# Patient Record
Sex: MALE | Race: WHITE | HISPANIC OR LATINO | Employment: OTHER | URBAN - METROPOLITAN AREA
[De-identification: names, ages, dates, MRNs, and addresses within clinical notes are randomized per-mention and may not be internally consistent; named-entity substitution may affect disease eponyms.]

---

## 2020-09-20 ENCOUNTER — HOSPITAL ENCOUNTER (EMERGENCY)
Facility: HOSPITAL | Age: 60
End: 2020-09-21
Attending: EMERGENCY MEDICINE | Admitting: EMERGENCY MEDICINE
Payer: COMMERCIAL

## 2020-09-20 DIAGNOSIS — I47.2 V-TACH (HCC): Primary | ICD-10-CM

## 2020-09-20 PROCEDURE — 80053 COMPREHEN METABOLIC PANEL: CPT | Performed by: EMERGENCY MEDICINE

## 2020-09-20 PROCEDURE — 36415 COLL VENOUS BLD VENIPUNCTURE: CPT | Performed by: EMERGENCY MEDICINE

## 2020-09-20 PROCEDURE — 99285 EMERGENCY DEPT VISIT HI MDM: CPT

## 2020-09-20 PROCEDURE — 85025 COMPLETE CBC W/AUTO DIFF WBC: CPT | Performed by: EMERGENCY MEDICINE

## 2020-09-20 PROCEDURE — 96375 TX/PRO/DX INJ NEW DRUG ADDON: CPT

## 2020-09-20 PROCEDURE — 99292 CRITICAL CARE ADDL 30 MIN: CPT | Performed by: EMERGENCY MEDICINE

## 2020-09-20 PROCEDURE — 84484 ASSAY OF TROPONIN QUANT: CPT | Performed by: EMERGENCY MEDICINE

## 2020-09-20 PROCEDURE — 85730 THROMBOPLASTIN TIME PARTIAL: CPT | Performed by: EMERGENCY MEDICINE

## 2020-09-20 PROCEDURE — 99291 CRITICAL CARE FIRST HOUR: CPT | Performed by: EMERGENCY MEDICINE

## 2020-09-20 PROCEDURE — 93005 ELECTROCARDIOGRAM TRACING: CPT

## 2020-09-20 PROCEDURE — 83735 ASSAY OF MAGNESIUM: CPT | Performed by: EMERGENCY MEDICINE

## 2020-09-20 PROCEDURE — 84100 ASSAY OF PHOSPHORUS: CPT | Performed by: EMERGENCY MEDICINE

## 2020-09-20 PROCEDURE — 85610 PROTHROMBIN TIME: CPT | Performed by: EMERGENCY MEDICINE

## 2020-09-20 PROCEDURE — 82948 REAGENT STRIP/BLOOD GLUCOSE: CPT

## 2020-09-20 RX ORDER — MAGNESIUM SULFATE 1 G/100ML
1 INJECTION INTRAVENOUS ONCE
Status: COMPLETED | OUTPATIENT
Start: 2020-09-21 | End: 2020-09-21

## 2020-09-20 RX ORDER — FENTANYL CITRATE 50 UG/ML
25 INJECTION, SOLUTION INTRAMUSCULAR; INTRAVENOUS ONCE
Status: COMPLETED | OUTPATIENT
Start: 2020-09-21 | End: 2020-09-20

## 2020-09-20 RX ADMIN — FENTANYL CITRATE 25 MCG: 50 INJECTION INTRAMUSCULAR; INTRAVENOUS at 23:58

## 2020-09-20 RX ADMIN — MAGNESIUM SULFATE HEPTAHYDRATE 1 G: 1 INJECTION, SOLUTION INTRAVENOUS at 23:59

## 2020-09-21 ENCOUNTER — HOSPITAL ENCOUNTER (INPATIENT)
Facility: HOSPITAL | Age: 60
LOS: 5 days | Discharge: HOME | DRG: 309 | End: 2020-09-26
Attending: ANESTHESIOLOGY | Admitting: ANESTHESIOLOGY
Payer: COMMERCIAL

## 2020-09-21 ENCOUNTER — APPOINTMENT (EMERGENCY)
Dept: RADIOLOGY | Facility: HOSPITAL | Age: 60
End: 2020-09-21
Payer: COMMERCIAL

## 2020-09-21 VITALS
TEMPERATURE: 98.4 F | RESPIRATION RATE: 18 BRPM | BODY MASS INDEX: 45.7 KG/M2 | DIASTOLIC BLOOD PRESSURE: 53 MMHG | OXYGEN SATURATION: 93 % | SYSTOLIC BLOOD PRESSURE: 103 MMHG | HEART RATE: 82 BPM | HEIGHT: 72 IN

## 2020-09-21 DIAGNOSIS — E11.65 TYPE 2 DIABETES MELLITUS WITH HYPERGLYCEMIA, WITH LONG-TERM CURRENT USE OF INSULIN (HCC): ICD-10-CM

## 2020-09-21 DIAGNOSIS — E87.6 HYPOKALEMIA: ICD-10-CM

## 2020-09-21 DIAGNOSIS — I25.10 CAD (CORONARY ARTERY DISEASE): ICD-10-CM

## 2020-09-21 DIAGNOSIS — Z79.4 TYPE 2 DIABETES MELLITUS WITH HYPERGLYCEMIA, WITH LONG-TERM CURRENT USE OF INSULIN (HCC): ICD-10-CM

## 2020-09-21 DIAGNOSIS — I47.2 VENTRICULAR TACHYCARDIA (HCC): Primary | ICD-10-CM

## 2020-09-21 DIAGNOSIS — I48.0 PAROXYSMAL A-FIB (HCC): ICD-10-CM

## 2020-09-21 DIAGNOSIS — I50.9 CHF (CONGESTIVE HEART FAILURE) (HCC): ICD-10-CM

## 2020-09-21 DIAGNOSIS — R52 PAIN: ICD-10-CM

## 2020-09-21 LAB
ALBUMIN SERPL BCP-MCNC: 2.7 G/DL (ref 3.5–5)
ALBUMIN SERPL BCP-MCNC: 3 G/DL (ref 3.5–5)
ALBUMIN SERPL BCP-MCNC: 3.3 G/DL (ref 3.5–5)
ALP SERPL-CCNC: 135 U/L (ref 46–116)
ALP SERPL-CCNC: 142 U/L (ref 46–116)
ALP SERPL-CCNC: 148 U/L (ref 46–116)
ALT SERPL W P-5'-P-CCNC: 26 U/L (ref 12–78)
ALT SERPL W P-5'-P-CCNC: 28 U/L (ref 12–78)
ALT SERPL W P-5'-P-CCNC: 29 U/L (ref 12–78)
ANION GAP SERPL CALCULATED.3IONS-SCNC: 15 MMOL/L (ref 4–13)
ANION GAP SERPL CALCULATED.3IONS-SCNC: 6 MMOL/L (ref 4–13)
ANION GAP SERPL CALCULATED.3IONS-SCNC: 7 MMOL/L (ref 4–13)
ANION GAP SERPL CALCULATED.3IONS-SCNC: 7 MMOL/L (ref 4–13)
APTT PPP: 34 SECONDS (ref 23–37)
AST SERPL W P-5'-P-CCNC: 23 U/L (ref 5–45)
AST SERPL W P-5'-P-CCNC: 28 U/L (ref 5–45)
AST SERPL W P-5'-P-CCNC: 30 U/L (ref 5–45)
ATRIAL RATE: 0 BPM
ATRIAL RATE: 277 BPM
ATRIAL RATE: 68 BPM
ATRIAL RATE: 94 BPM
ATRIAL RATE: 98 BPM
BASE EXCESS BLDA CALC-SCNC: 8 MMOL/L (ref -2–3)
BASOPHILS # BLD AUTO: 0.03 THOUSANDS/ΜL (ref 0–0.1)
BASOPHILS # BLD AUTO: 0.09 THOUSANDS/ΜL (ref 0–0.1)
BASOPHILS NFR BLD AUTO: 0 % (ref 0–1)
BASOPHILS NFR BLD AUTO: 1 % (ref 0–1)
BILIRUB SERPL-MCNC: 0.33 MG/DL (ref 0.2–1)
BILIRUB SERPL-MCNC: 0.35 MG/DL (ref 0.2–1)
BILIRUB SERPL-MCNC: 0.4 MG/DL (ref 0.2–1)
BILIRUB UR QL STRIP: NEGATIVE
BUN SERPL-MCNC: 20 MG/DL (ref 5–25)
BUN SERPL-MCNC: 20 MG/DL (ref 5–25)
BUN SERPL-MCNC: 23 MG/DL (ref 5–25)
BUN SERPL-MCNC: 28 MG/DL (ref 5–25)
CA-I BLD-SCNC: 1.05 MMOL/L (ref 1.12–1.32)
CALCIUM ALBUM COR SERPL-MCNC: 9 MG/DL (ref 8.3–10.1)
CALCIUM ALBUM COR SERPL-MCNC: 9.2 MG/DL (ref 8.3–10.1)
CALCIUM ALBUM COR SERPL-MCNC: 9.3 MG/DL (ref 8.3–10.1)
CALCIUM SERPL-MCNC: 8.2 MG/DL (ref 8.3–10.1)
CALCIUM SERPL-MCNC: 8.3 MG/DL (ref 8.3–10.1)
CALCIUM SERPL-MCNC: 8.4 MG/DL (ref 8.3–10.1)
CALCIUM SERPL-MCNC: 8.6 MG/DL (ref 8.3–10.1)
CHLORIDE SERPL-SCNC: 100 MMOL/L (ref 100–108)
CHLORIDE SERPL-SCNC: 103 MMOL/L (ref 100–108)
CHLORIDE SERPL-SCNC: 94 MMOL/L (ref 100–108)
CHLORIDE SERPL-SCNC: 96 MMOL/L (ref 100–108)
CLARITY UR: CLEAR
CO2 SERPL-SCNC: 28 MMOL/L (ref 21–32)
CO2 SERPL-SCNC: 29 MMOL/L (ref 21–32)
CO2 SERPL-SCNC: 29 MMOL/L (ref 21–32)
CO2 SERPL-SCNC: 32 MMOL/L (ref 21–32)
COLOR UR: YELLOW
CREAT SERPL-MCNC: 1.54 MG/DL (ref 0.6–1.3)
CREAT SERPL-MCNC: 1.75 MG/DL (ref 0.6–1.3)
CREAT SERPL-MCNC: 1.76 MG/DL (ref 0.6–1.3)
CREAT SERPL-MCNC: 2.15 MG/DL (ref 0.6–1.3)
EOSINOPHIL # BLD AUTO: 0.02 THOUSAND/ΜL (ref 0–0.61)
EOSINOPHIL # BLD AUTO: 0.44 THOUSAND/ΜL (ref 0–0.61)
EOSINOPHIL NFR BLD AUTO: 0 % (ref 0–6)
EOSINOPHIL NFR BLD AUTO: 3 % (ref 0–6)
ERYTHROCYTE [DISTWIDTH] IN BLOOD BY AUTOMATED COUNT: 16.3 % (ref 11.6–15.1)
ERYTHROCYTE [DISTWIDTH] IN BLOOD BY AUTOMATED COUNT: 16.4 % (ref 11.6–15.1)
GFR SERPL CREATININE-BSD FRML MDRD: 33 ML/MIN/1.73SQ M
GFR SERPL CREATININE-BSD FRML MDRD: 41 ML/MIN/1.73SQ M
GFR SERPL CREATININE-BSD FRML MDRD: 42 ML/MIN/1.73SQ M
GFR SERPL CREATININE-BSD FRML MDRD: 49 ML/MIN/1.73SQ M
GLUCOSE SERPL-MCNC: 127 MG/DL (ref 65–140)
GLUCOSE SERPL-MCNC: 135 MG/DL (ref 65–140)
GLUCOSE SERPL-MCNC: 162 MG/DL (ref 65–140)
GLUCOSE SERPL-MCNC: 167 MG/DL (ref 65–140)
GLUCOSE SERPL-MCNC: 173 MG/DL (ref 65–140)
GLUCOSE SERPL-MCNC: 194 MG/DL (ref 65–140)
GLUCOSE SERPL-MCNC: 198 MG/DL (ref 65–140)
GLUCOSE SERPL-MCNC: 203 MG/DL (ref 65–140)
GLUCOSE SERPL-MCNC: 217 MG/DL (ref 65–140)
GLUCOSE SERPL-MCNC: 228 MG/DL (ref 65–140)
GLUCOSE SERPL-MCNC: 272 MG/DL (ref 65–140)
GLUCOSE UR STRIP-MCNC: NEGATIVE MG/DL
HCO3 BLDA-SCNC: 30.5 MMOL/L (ref 24–30)
HCT VFR BLD AUTO: 38.6 % (ref 36.5–49.3)
HCT VFR BLD AUTO: 40.9 % (ref 36.5–49.3)
HCT VFR BLD CALC: 41 % (ref 36.5–49.3)
HGB BLD-MCNC: 11.8 G/DL (ref 12–17)
HGB BLD-MCNC: 12.8 G/DL (ref 12–17)
HGB BLDA-MCNC: 13.9 G/DL (ref 12–17)
HGB UR QL STRIP.AUTO: NEGATIVE
IMM GRANULOCYTES # BLD AUTO: 0.05 THOUSAND/UL (ref 0–0.2)
IMM GRANULOCYTES # BLD AUTO: 0.08 THOUSAND/UL (ref 0–0.2)
IMM GRANULOCYTES NFR BLD AUTO: 0 % (ref 0–2)
IMM GRANULOCYTES NFR BLD AUTO: 1 % (ref 0–2)
INR PPP: 1.97 (ref 0.84–1.19)
KETONES UR STRIP-MCNC: NEGATIVE MG/DL
LEUKOCYTE ESTERASE UR QL STRIP: NEGATIVE
LYMPHOCYTES # BLD AUTO: 0.36 THOUSANDS/ΜL (ref 0.6–4.47)
LYMPHOCYTES # BLD AUTO: 2.6 THOUSANDS/ΜL (ref 0.6–4.47)
LYMPHOCYTES NFR BLD AUTO: 17 % (ref 14–44)
LYMPHOCYTES NFR BLD AUTO: 3 % (ref 14–44)
MAGNESIUM SERPL-MCNC: 1.8 MG/DL (ref 1.6–2.6)
MAGNESIUM SERPL-MCNC: 2.6 MG/DL (ref 1.6–2.6)
MCH RBC QN AUTO: 25.1 PG (ref 26.8–34.3)
MCH RBC QN AUTO: 25.3 PG (ref 26.8–34.3)
MCHC RBC AUTO-ENTMCNC: 30.6 G/DL (ref 31.4–37.4)
MCHC RBC AUTO-ENTMCNC: 31.3 G/DL (ref 31.4–37.4)
MCV RBC AUTO: 81 FL (ref 82–98)
MCV RBC AUTO: 82 FL (ref 82–98)
MONOCYTES # BLD AUTO: 0.56 THOUSAND/ΜL (ref 0.17–1.22)
MONOCYTES # BLD AUTO: 1.43 THOUSAND/ΜL (ref 0.17–1.22)
MONOCYTES NFR BLD AUTO: 5 % (ref 4–12)
MONOCYTES NFR BLD AUTO: 9 % (ref 4–12)
NEUTROPHILS # BLD AUTO: 10.7 THOUSANDS/ΜL (ref 1.85–7.62)
NEUTROPHILS # BLD AUTO: 11.37 THOUSANDS/ΜL (ref 1.85–7.62)
NEUTS SEG NFR BLD AUTO: 69 % (ref 43–75)
NEUTS SEG NFR BLD AUTO: 92 % (ref 43–75)
NITRITE UR QL STRIP: NEGATIVE
NRBC BLD AUTO-RTO: 0 /100 WBCS
NRBC BLD AUTO-RTO: 0 /100 WBCS
NT-PROBNP SERPL-MCNC: 614 PG/ML
P AXIS: 236 DEGREES
P AXIS: 58 DEGREES
PCO2 BLD: 32 MMOL/L (ref 21–32)
PCO2 BLD: 34.1 MM HG (ref 42–50)
PH BLD: 7.56 [PH] (ref 7.3–7.4)
PH UR STRIP.AUTO: 7 [PH]
PHOSPHATE SERPL-MCNC: 1.7 MG/DL (ref 2.7–4.5)
PHOSPHATE SERPL-MCNC: 3.4 MG/DL (ref 2.7–4.5)
PLATELET # BLD AUTO: 259 THOUSANDS/UL (ref 149–390)
PLATELET # BLD AUTO: 343 THOUSANDS/UL (ref 149–390)
PMV BLD AUTO: 9.6 FL (ref 8.9–12.7)
PMV BLD AUTO: 9.6 FL (ref 8.9–12.7)
PO2 BLD: 36 MM HG (ref 35–45)
POTASSIUM BLD-SCNC: <2 MMOL/L (ref 3.5–5.3)
POTASSIUM SERPL-SCNC: 1.8 MMOL/L (ref 3.5–5.3)
POTASSIUM SERPL-SCNC: 2.7 MMOL/L (ref 3.5–5.3)
POTASSIUM SERPL-SCNC: 3 MMOL/L (ref 3.5–5.3)
POTASSIUM SERPL-SCNC: 3.5 MMOL/L (ref 3.5–5.3)
PR INTERVAL: 178 MS
PR INTERVAL: 192 MS
PROT SERPL-MCNC: 6.8 G/DL (ref 6.4–8.2)
PROT SERPL-MCNC: 7.2 G/DL (ref 6.4–8.2)
PROT SERPL-MCNC: 7.9 G/DL (ref 6.4–8.2)
PROT UR STRIP-MCNC: NEGATIVE MG/DL
PROTHROMBIN TIME: 22.5 SECONDS (ref 11.6–14.5)
QRS AXIS: 33 DEGREES
QRS AXIS: 35 DEGREES
QRS AXIS: 40 DEGREES
QRS AXIS: 46 DEGREES
QRS AXIS: 55 DEGREES
QRSD INTERVAL: 102 MS
QRSD INTERVAL: 125 MS
QRSD INTERVAL: 134 MS
QRSD INTERVAL: 84 MS
QRSD INTERVAL: 86 MS
QT INTERVAL: 320 MS
QT INTERVAL: 322 MS
QT INTERVAL: 344 MS
QT INTERVAL: 372 MS
QT INTERVAL: 383 MS
QTC INTERVAL: 408 MS
QTC INTERVAL: 408 MS
QTC INTERVAL: 417 MS
QTC INTERVAL: 436 MS
QTC INTERVAL: 450 MS
RBC # BLD AUTO: 4.71 MILLION/UL (ref 3.88–5.62)
RBC # BLD AUTO: 5.06 MILLION/UL (ref 3.88–5.62)
SAO2 % BLD FROM PO2: 77 % (ref 60–85)
SARS-COV-2 RNA RESP QL NAA+PROBE: NEGATIVE
SODIUM BLD-SCNC: 139 MMOL/L (ref 136–145)
SODIUM SERPL-SCNC: 135 MMOL/L (ref 136–145)
SODIUM SERPL-SCNC: 136 MMOL/L (ref 136–145)
SODIUM SERPL-SCNC: 137 MMOL/L (ref 136–145)
SODIUM SERPL-SCNC: 138 MMOL/L (ref 136–145)
SP GR UR STRIP.AUTO: 1.01 (ref 1–1.03)
SPECIMEN SOURCE: ABNORMAL
T WAVE AXIS: 210 DEGREES
T WAVE AXIS: 224 DEGREES
T WAVE AXIS: 232 DEGREES
T WAVE AXIS: 240 DEGREES
T WAVE AXIS: 260 DEGREES
T4 FREE SERPL-MCNC: 1.22 NG/DL (ref 0.76–1.46)
TROPONIN I SERPL-MCNC: 0.08 NG/ML
TROPONIN I SERPL-MCNC: 2.05 NG/ML
TROPONIN I SERPL-MCNC: 2.07 NG/ML
TROPONIN I SERPL-MCNC: 2.38 NG/ML
TROPONIN I SERPL-MCNC: 2.49 NG/ML
TSH SERPL DL<=0.05 MIU/L-ACNC: 5.08 UIU/ML (ref 0.36–3.74)
UROBILINOGEN UR QL STRIP.AUTO: 0.2 E.U./DL
VENTRICULAR RATE: 101 BPM
VENTRICULAR RATE: 68 BPM
VENTRICULAR RATE: 88 BPM
VENTRICULAR RATE: 97 BPM
VENTRICULAR RATE: 98 BPM
WBC # BLD AUTO: 12.39 THOUSAND/UL (ref 4.31–10.16)
WBC # BLD AUTO: 15.34 THOUSAND/UL (ref 4.31–10.16)

## 2020-09-21 PROCEDURE — 85014 HEMATOCRIT: CPT

## 2020-09-21 PROCEDURE — 80048 BASIC METABOLIC PNL TOTAL CA: CPT | Performed by: INTERNAL MEDICINE

## 2020-09-21 PROCEDURE — 84132 ASSAY OF SERUM POTASSIUM: CPT

## 2020-09-21 PROCEDURE — 96366 THER/PROPH/DIAG IV INF ADDON: CPT

## 2020-09-21 PROCEDURE — 82948 REAGENT STRIP/BLOOD GLUCOSE: CPT

## 2020-09-21 PROCEDURE — 99221 1ST HOSP IP/OBS SF/LOW 40: CPT | Performed by: INTERNAL MEDICINE

## 2020-09-21 PROCEDURE — 96375 TX/PRO/DX INJ NEW DRUG ADDON: CPT

## 2020-09-21 PROCEDURE — 94760 N-INVAS EAR/PLS OXIMETRY 1: CPT

## 2020-09-21 PROCEDURE — 99223 1ST HOSP IP/OBS HIGH 75: CPT | Performed by: INTERNAL MEDICINE

## 2020-09-21 PROCEDURE — U0003 INFECTIOUS AGENT DETECTION BY NUCLEIC ACID (DNA OR RNA); SEVERE ACUTE RESPIRATORY SYNDROME CORONAVIRUS 2 (SARS-COV-2) (CORONAVIRUS DISEASE [COVID-19]), AMPLIFIED PROBE TECHNIQUE, MAKING USE OF HIGH THROUGHPUT TECHNOLOGIES AS DESCRIBED BY CMS-2020-01-R: HCPCS | Performed by: PHYSICIAN ASSISTANT

## 2020-09-21 PROCEDURE — 83880 ASSAY OF NATRIURETIC PEPTIDE: CPT | Performed by: ANESTHESIOLOGY

## 2020-09-21 PROCEDURE — 84484 ASSAY OF TROPONIN QUANT: CPT | Performed by: INTERNAL MEDICINE

## 2020-09-21 PROCEDURE — 83735 ASSAY OF MAGNESIUM: CPT | Performed by: EMERGENCY MEDICINE

## 2020-09-21 PROCEDURE — 80053 COMPREHEN METABOLIC PANEL: CPT | Performed by: EMERGENCY MEDICINE

## 2020-09-21 PROCEDURE — 84443 ASSAY THYROID STIM HORMONE: CPT | Performed by: EMERGENCY MEDICINE

## 2020-09-21 PROCEDURE — 84484 ASSAY OF TROPONIN QUANT: CPT | Performed by: EMERGENCY MEDICINE

## 2020-09-21 PROCEDURE — 85025 COMPLETE CBC W/AUTO DIFF WBC: CPT | Performed by: EMERGENCY MEDICINE

## 2020-09-21 PROCEDURE — 84439 ASSAY OF FREE THYROXINE: CPT | Performed by: EMERGENCY MEDICINE

## 2020-09-21 PROCEDURE — 82947 ASSAY GLUCOSE BLOOD QUANT: CPT

## 2020-09-21 PROCEDURE — 93005 ELECTROCARDIOGRAM TRACING: CPT

## 2020-09-21 PROCEDURE — 84100 ASSAY OF PHOSPHORUS: CPT | Performed by: EMERGENCY MEDICINE

## 2020-09-21 PROCEDURE — 82803 BLOOD GASES ANY COMBINATION: CPT

## 2020-09-21 PROCEDURE — 81003 URINALYSIS AUTO W/O SCOPE: CPT | Performed by: EMERGENCY MEDICINE

## 2020-09-21 PROCEDURE — 94660 CPAP INITIATION&MGMT: CPT

## 2020-09-21 PROCEDURE — 71045 X-RAY EXAM CHEST 1 VIEW: CPT

## 2020-09-21 PROCEDURE — 93010 ELECTROCARDIOGRAM REPORT: CPT | Performed by: INTERNAL MEDICINE

## 2020-09-21 PROCEDURE — NC001 PR NO CHARGE: Performed by: INTERNAL MEDICINE

## 2020-09-21 PROCEDURE — 82330 ASSAY OF CALCIUM: CPT

## 2020-09-21 PROCEDURE — 96368 THER/DIAG CONCURRENT INF: CPT

## 2020-09-21 PROCEDURE — 80053 COMPREHEN METABOLIC PANEL: CPT | Performed by: INTERNAL MEDICINE

## 2020-09-21 PROCEDURE — 84295 ASSAY OF SERUM SODIUM: CPT

## 2020-09-21 PROCEDURE — 99223 1ST HOSP IP/OBS HIGH 75: CPT | Performed by: ANESTHESIOLOGY

## 2020-09-21 PROCEDURE — 96365 THER/PROPH/DIAG IV INF INIT: CPT

## 2020-09-21 RX ORDER — CHLORAL HYDRATE 500 MG
1000 CAPSULE ORAL DAILY
Status: DISCONTINUED | OUTPATIENT
Start: 2020-09-21 | End: 2020-09-26 | Stop reason: HOSPADM

## 2020-09-21 RX ORDER — FENTANYL CITRATE 50 UG/ML
1 INJECTION, SOLUTION INTRAMUSCULAR; INTRAVENOUS ONCE
Status: COMPLETED | OUTPATIENT
Start: 2020-09-21 | End: 2020-09-21

## 2020-09-21 RX ORDER — POTASSIUM CHLORIDE 29.8 MG/ML
40 INJECTION INTRAVENOUS ONCE
Status: COMPLETED | OUTPATIENT
Start: 2020-09-21 | End: 2020-09-21

## 2020-09-21 RX ORDER — LANOLIN ALCOHOL/MO/W.PET/CERES
6 CREAM (GRAM) TOPICAL
Status: DISCONTINUED | OUTPATIENT
Start: 2020-09-21 | End: 2020-09-26 | Stop reason: HOSPADM

## 2020-09-21 RX ORDER — POTASSIUM CHLORIDE 20 MEQ/1
40 TABLET, EXTENDED RELEASE ORAL ONCE
Status: COMPLETED | OUTPATIENT
Start: 2020-09-21 | End: 2020-09-21

## 2020-09-21 RX ORDER — MAGNESIUM SULFATE 1 G/100ML
1 INJECTION INTRAVENOUS ONCE
Status: COMPLETED | OUTPATIENT
Start: 2020-09-21 | End: 2020-09-21

## 2020-09-21 RX ORDER — POTASSIUM CHLORIDE 14.9 MG/ML
20 INJECTION INTRAVENOUS ONCE
Status: COMPLETED | OUTPATIENT
Start: 2020-09-21 | End: 2020-09-22

## 2020-09-21 RX ORDER — POTASSIUM CHLORIDE 29.8 MG/ML
40 INJECTION INTRAVENOUS ONCE
Status: DISCONTINUED | OUTPATIENT
Start: 2020-09-21 | End: 2020-09-21 | Stop reason: HOSPADM

## 2020-09-21 RX ORDER — OXYCODONE HYDROCHLORIDE 5 MG/1
5 TABLET ORAL EVERY 6 HOURS PRN
Status: DISCONTINUED | OUTPATIENT
Start: 2020-09-21 | End: 2020-09-22

## 2020-09-21 RX ORDER — GABAPENTIN 300 MG/1
600 CAPSULE ORAL 3 TIMES DAILY
Status: DISCONTINUED | OUTPATIENT
Start: 2020-09-21 | End: 2020-09-26 | Stop reason: HOSPADM

## 2020-09-21 RX ORDER — OXYCODONE HYDROCHLORIDE 5 MG/1
5 CAPSULE ORAL EVERY 6 HOURS PRN
COMMUNITY

## 2020-09-21 RX ORDER — ASPIRIN 81 MG/1
81 TABLET, CHEWABLE ORAL DAILY
Status: DISCONTINUED | OUTPATIENT
Start: 2020-09-21 | End: 2020-09-26 | Stop reason: HOSPADM

## 2020-09-21 RX ORDER — LIDOCAINE HYDROCHLORIDE ANHYDROUS AND DEXTROSE MONOHYDRATE .8; 5 G/100ML; G/100ML
1 INJECTION, SOLUTION INTRAVENOUS CONTINUOUS
Status: DISCONTINUED | OUTPATIENT
Start: 2020-09-21 | End: 2020-09-21 | Stop reason: HOSPADM

## 2020-09-21 RX ORDER — HEPARIN SODIUM 5000 [USP'U]/ML
7500 INJECTION, SOLUTION INTRAVENOUS; SUBCUTANEOUS EVERY 8 HOURS SCHEDULED
Status: DISCONTINUED | OUTPATIENT
Start: 2020-09-21 | End: 2020-09-21

## 2020-09-21 RX ORDER — FENTANYL CITRATE 50 UG/ML
50 INJECTION, SOLUTION INTRAMUSCULAR; INTRAVENOUS ONCE
Status: COMPLETED | OUTPATIENT
Start: 2020-09-21 | End: 2020-09-21

## 2020-09-21 RX ORDER — FLUTICASONE PROPIONATE 50 MCG
2 SPRAY, SUSPENSION (ML) NASAL DAILY
Status: DISCONTINUED | OUTPATIENT
Start: 2020-09-21 | End: 2020-09-26 | Stop reason: HOSPADM

## 2020-09-21 RX ORDER — ACETAMINOPHEN 325 MG/1
650 TABLET ORAL EVERY 4 HOURS PRN
Status: DISCONTINUED | OUTPATIENT
Start: 2020-09-21 | End: 2020-09-26 | Stop reason: HOSPADM

## 2020-09-21 RX ORDER — AMIODARONE HYDROCHLORIDE 50 MG/ML
INJECTION, SOLUTION INTRAVENOUS
Status: COMPLETED
Start: 2020-09-21 | End: 2020-09-21

## 2020-09-21 RX ORDER — POTASSIUM CHLORIDE 20 MEQ/1
20 TABLET, EXTENDED RELEASE ORAL ONCE
Status: COMPLETED | OUTPATIENT
Start: 2020-09-21 | End: 2020-09-21

## 2020-09-21 RX ORDER — FENTANYL CITRATE 50 UG/ML
INJECTION, SOLUTION INTRAMUSCULAR; INTRAVENOUS
Status: COMPLETED
Start: 2020-09-21 | End: 2020-09-21

## 2020-09-21 RX ORDER — FENTANYL CITRATE 50 UG/ML
25 INJECTION, SOLUTION INTRAMUSCULAR; INTRAVENOUS
Status: DISCONTINUED | OUTPATIENT
Start: 2020-09-21 | End: 2020-09-22

## 2020-09-21 RX ORDER — PANTOPRAZOLE SODIUM 40 MG/1
40 TABLET, DELAYED RELEASE ORAL
Status: DISCONTINUED | OUTPATIENT
Start: 2020-09-21 | End: 2020-09-26 | Stop reason: HOSPADM

## 2020-09-21 RX ORDER — FENTANYL CITRATE 50 UG/ML
25 INJECTION, SOLUTION INTRAMUSCULAR; INTRAVENOUS ONCE
Status: COMPLETED | OUTPATIENT
Start: 2020-09-21 | End: 2020-09-21

## 2020-09-21 RX ORDER — POTASSIUM CHLORIDE 20 MEQ/1
20 TABLET, EXTENDED RELEASE ORAL ONCE
Status: DISCONTINUED | OUTPATIENT
Start: 2020-09-21 | End: 2020-09-21

## 2020-09-21 RX ORDER — AMILORIDE HYDROCHLORIDE 5 MG/1
5 TABLET ORAL 2 TIMES DAILY
Status: DISCONTINUED | OUTPATIENT
Start: 2020-09-21 | End: 2020-09-26 | Stop reason: HOSPADM

## 2020-09-21 RX ORDER — POLYETHYLENE GLYCOL 3350 17 G/17G
17 POWDER, FOR SOLUTION ORAL DAILY
Status: DISCONTINUED | OUTPATIENT
Start: 2020-09-21 | End: 2020-09-26 | Stop reason: HOSPADM

## 2020-09-21 RX ORDER — POTASSIUM CHLORIDE 14.9 MG/ML
20 INJECTION INTRAVENOUS
Status: COMPLETED | OUTPATIENT
Start: 2020-09-21 | End: 2020-09-21

## 2020-09-21 RX ORDER — ISOSORBIDE MONONITRATE 60 MG/1
60 TABLET, EXTENDED RELEASE ORAL DAILY
Status: DISCONTINUED | OUTPATIENT
Start: 2020-09-21 | End: 2020-09-22

## 2020-09-21 RX ORDER — ATORVASTATIN CALCIUM 80 MG/1
80 TABLET, FILM COATED ORAL
Status: DISCONTINUED | OUTPATIENT
Start: 2020-09-21 | End: 2020-09-26 | Stop reason: HOSPADM

## 2020-09-21 RX ORDER — SENNOSIDES 8.6 MG
1 TABLET ORAL
Status: DISCONTINUED | OUTPATIENT
Start: 2020-09-21 | End: 2020-09-26 | Stop reason: HOSPADM

## 2020-09-21 RX ORDER — LIDOCAINE HYDROCHLORIDE ANHYDROUS AND DEXTROSE MONOHYDRATE .8; 5 G/100ML; G/100ML
1 INJECTION, SOLUTION INTRAVENOUS CONTINUOUS
Status: DISCONTINUED | OUTPATIENT
Start: 2020-09-21 | End: 2020-09-21

## 2020-09-21 RX ORDER — FERROUS SULFATE 325(65) MG
325 TABLET ORAL EVERY OTHER DAY
Status: DISCONTINUED | OUTPATIENT
Start: 2020-09-21 | End: 2020-09-26 | Stop reason: HOSPADM

## 2020-09-21 RX ORDER — INSULIN GLARGINE 100 [IU]/ML
30 INJECTION, SOLUTION SUBCUTANEOUS EVERY 12 HOURS SCHEDULED
Status: DISCONTINUED | OUTPATIENT
Start: 2020-09-21 | End: 2020-09-26 | Stop reason: HOSPADM

## 2020-09-21 RX ADMIN — LIDOCAINE HYDROCHLORIDE 100 MG: 20 INJECTION, SOLUTION INTRAVENOUS at 01:29

## 2020-09-21 RX ADMIN — INSULIN LISPRO 4 UNITS: 100 INJECTION, SOLUTION INTRAVENOUS; SUBCUTANEOUS at 11:08

## 2020-09-21 RX ADMIN — POTASSIUM CHLORIDE 40 MEQ: 29.8 INJECTION, SOLUTION INTRAVENOUS at 05:57

## 2020-09-21 RX ADMIN — AMIODARONE HYDROCHLORIDE 1 MG/MIN: 50 INJECTION, SOLUTION INTRAVENOUS at 16:54

## 2020-09-21 RX ADMIN — FENTANYL CITRATE 25 MCG: 50 INJECTION INTRAMUSCULAR; INTRAVENOUS at 19:50

## 2020-09-21 RX ADMIN — PANTOPRAZOLE SODIUM 40 MG: 40 TABLET, DELAYED RELEASE ORAL at 05:57

## 2020-09-21 RX ADMIN — GABAPENTIN 600 MG: 300 CAPSULE ORAL at 16:14

## 2020-09-21 RX ADMIN — OMEGA-3 FATTY ACIDS CAP 1000 MG 1000 MG: 1000 CAP at 08:21

## 2020-09-21 RX ADMIN — AMPICILLIN SODIUM 2000 MG: 2 INJECTION, POWDER, FOR SOLUTION INTRAMUSCULAR; INTRAVENOUS at 17:28

## 2020-09-21 RX ADMIN — FLUTICASONE PROPIONATE 2 SPRAY: 50 SPRAY, METERED NASAL at 08:21

## 2020-09-21 RX ADMIN — ATORVASTATIN CALCIUM 80 MG: 80 TABLET, FILM COATED ORAL at 16:14

## 2020-09-21 RX ADMIN — LIDOCAINE HYDROCHLORIDE 10 ML: 20 SOLUTION ORAL; TOPICAL at 20:13

## 2020-09-21 RX ADMIN — OXYCODONE HYDROCHLORIDE 5 MG: 5 TABLET ORAL at 15:03

## 2020-09-21 RX ADMIN — SODIUM PHOSPHATE, MONOBASIC, MONOHYDRATE 21 MMOL: 276; 142 INJECTION, SOLUTION INTRAVENOUS at 00:58

## 2020-09-21 RX ADMIN — AMILORIDE HYDROCLORIDE 5 MG: 5 TABLET ORAL at 22:01

## 2020-09-21 RX ADMIN — POTASSIUM CHLORIDE 40 MEQ: 1500 TABLET, EXTENDED RELEASE ORAL at 06:34

## 2020-09-21 RX ADMIN — GABAPENTIN 600 MG: 300 CAPSULE ORAL at 08:20

## 2020-09-21 RX ADMIN — POTASSIUM CHLORIDE 40 MEQ: 1500 TABLET, EXTENDED RELEASE ORAL at 15:03

## 2020-09-21 RX ADMIN — FENTANYL CITRATE 25 MCG: 50 INJECTION INTRAMUSCULAR; INTRAVENOUS at 13:43

## 2020-09-21 RX ADMIN — GABAPENTIN 600 MG: 300 CAPSULE ORAL at 20:04

## 2020-09-21 RX ADMIN — AMIODARONE HYDROCHLORIDE 1 MG/MIN: 50 INJECTION, SOLUTION INTRAVENOUS at 00:37

## 2020-09-21 RX ADMIN — FENTANYL CITRATE 25 MCG: 50 INJECTION INTRAMUSCULAR; INTRAVENOUS at 10:39

## 2020-09-21 RX ADMIN — INSULIN LISPRO 2 UNITS: 100 INJECTION, SOLUTION INTRAVENOUS; SUBCUTANEOUS at 16:18

## 2020-09-21 RX ADMIN — POTASSIUM CHLORIDE 40 MEQ: 1500 TABLET, EXTENDED RELEASE ORAL at 23:32

## 2020-09-21 RX ADMIN — AMIODARONE HYDROCHLORIDE 1 MG/MIN: 50 INJECTION, SOLUTION INTRAVENOUS at 06:25

## 2020-09-21 RX ADMIN — ASPIRIN 81 MG CHEWABLE TABLET 81 MG: 81 TABLET CHEWABLE at 08:20

## 2020-09-21 RX ADMIN — INSULIN GLARGINE 30 UNITS: 100 INJECTION, SOLUTION SUBCUTANEOUS at 13:18

## 2020-09-21 RX ADMIN — LIDOCAINE HYDROCHLORIDE ANHYDROUS AND DEXTROSE MONOHYDRATE 1 MG/MIN: .8; 5 INJECTION, SOLUTION INTRAVENOUS at 01:46

## 2020-09-21 RX ADMIN — OXYCODONE HYDROCHLORIDE 5 MG: 5 TABLET ORAL at 22:03

## 2020-09-21 RX ADMIN — FENTANYL CITRATE 25 MCG: 50 INJECTION INTRAMUSCULAR; INTRAVENOUS at 23:30

## 2020-09-21 RX ADMIN — AMPICILLIN SODIUM 2000 MG: 2 INJECTION, POWDER, FOR SOLUTION INTRAMUSCULAR; INTRAVENOUS at 11:08

## 2020-09-21 RX ADMIN — ISOSORBIDE MONONITRATE 60 MG: 60 TABLET, EXTENDED RELEASE ORAL at 08:21

## 2020-09-21 RX ADMIN — FENTANYL CITRATE 25 MCG: 50 INJECTION INTRAMUSCULAR; INTRAVENOUS at 05:14

## 2020-09-21 RX ADMIN — POTASSIUM CHLORIDE 40 MEQ: 1500 TABLET, EXTENDED RELEASE ORAL at 05:57

## 2020-09-21 RX ADMIN — RIVAROXABAN 20 MG: 20 TABLET, FILM COATED ORAL at 16:30

## 2020-09-21 RX ADMIN — POTASSIUM CHLORIDE 20 MEQ: 1500 TABLET, EXTENDED RELEASE ORAL at 02:17

## 2020-09-21 RX ADMIN — FENTANYL CITRATE 25 MCG: 50 INJECTION INTRAMUSCULAR; INTRAVENOUS at 01:33

## 2020-09-21 RX ADMIN — MAGNESIUM SULFATE HEPTAHYDRATE 1 G: 1 INJECTION, SOLUTION INTRAVENOUS at 00:04

## 2020-09-21 RX ADMIN — FENTANYL CITRATE 50 MCG: 50 INJECTION INTRAMUSCULAR; INTRAVENOUS at 02:40

## 2020-09-21 RX ADMIN — FENTANYL CITRATE 25 MCG: 50 INJECTION INTRAMUSCULAR; INTRAVENOUS at 17:30

## 2020-09-21 RX ADMIN — OXYCODONE HYDROCHLORIDE 5 MG: 5 TABLET ORAL at 07:42

## 2020-09-21 RX ADMIN — POTASSIUM CHLORIDE 20 MEQ: 1500 TABLET, EXTENDED RELEASE ORAL at 00:11

## 2020-09-21 RX ADMIN — MAGNESIUM SULFATE HEPTAHYDRATE 1 G: 1 INJECTION, SOLUTION INTRAVENOUS at 01:58

## 2020-09-21 RX ADMIN — POTASSIUM CHLORIDE 20 MEQ: 200 INJECTION, SOLUTION INTRAVENOUS at 02:28

## 2020-09-21 RX ADMIN — POTASSIUM CHLORIDE 40 MEQ: 29.8 INJECTION, SOLUTION INTRAVENOUS at 14:59

## 2020-09-21 RX ADMIN — FENTANYL CITRATE 25 MCG: 50 INJECTION INTRAMUSCULAR; INTRAVENOUS at 08:22

## 2020-09-21 RX ADMIN — POTASSIUM CHLORIDE 20 MEQ: 200 INJECTION, SOLUTION INTRAVENOUS at 00:17

## 2020-09-21 RX ADMIN — AMPICILLIN SODIUM 2000 MG: 2 INJECTION, POWDER, FOR SOLUTION INTRAMUSCULAR; INTRAVENOUS at 06:28

## 2020-09-21 RX ADMIN — AMPICILLIN SODIUM 2000 MG: 2 INJECTION, POWDER, FOR SOLUTION INTRAMUSCULAR; INTRAVENOUS at 23:03

## 2020-09-21 RX ADMIN — INSULIN GLARGINE 30 UNITS: 100 INJECTION, SOLUTION SUBCUTANEOUS at 20:12

## 2020-09-21 RX ADMIN — POTASSIUM CHLORIDE 40 MEQ: 29.8 INJECTION, SOLUTION INTRAVENOUS at 09:37

## 2020-09-21 RX ADMIN — POTASSIUM CHLORIDE 20 MEQ: 14.9 INJECTION, SOLUTION INTRAVENOUS at 23:51

## 2020-09-21 RX ADMIN — MELATONIN 6 MG: at 22:01

## 2020-09-21 RX ADMIN — LIDOCAINE HYDROCHLORIDE ANHYDROUS AND DEXTROSE MONOHYDRATE 1 MG/MIN: .8; 5 INJECTION, SOLUTION INTRAVENOUS at 06:25

## 2020-09-21 RX ADMIN — FERROUS SULFATE TAB 325 MG (65 MG ELEMENTAL FE) 325 MG: 325 (65 FE) TAB at 08:20

## 2020-09-21 NOTE — PROGRESS NOTES
Spent time reviewing patient's chart congestive heart failure with nasal 34% diastolic dysfunction with tongue cancer status post chemo came in with a potassium of 1 5 with recurrent V-tach and shocks in the midst of 160 mEq of potassium infusion patient is still complaining of pain but creatinine is improving placed on amiodarone and lidocaine drips no further shocks noted  Of note coordination of care with Nephrology and Cardiology at the bedside today to discuss plan of care, will re-initiate  amilodiride 0 5 mg twice a day after another 80 mEq of potassium, continue ampicillin per ID, advance diet to continue BiPAP 24/16 q h s   I reviewed patient's chest x-ray as well as his EKGs

## 2020-09-21 NOTE — CONSULTS
Heart Failure - Consult  Deondre Henderson 62 y o  male MRN: [id number]  [facility]: [id number]      Reason for Consult / Principal Problem: optimization of heart failure therapies    Physician Requesting Consult: Elida Goel MD    OP Cardiologist: Karishma Koroma MD      Assessment:  # VF cardiac arrest 2/2 R-on-T from severe hypokalemia  -etiology of hypokalemia likely diuretic (predemontantly metolazone) therapy; cetuximab also has a predilection to cause hypokalemia  -on amio and lido gtt without recurrence of VT/VF  -hypokalemia improving with holding diuretic, supplementing K  # Chronic ischemic HFrEF 45-50%; FC II-III; Stage C  -likely some degree of volume overload present  -TTE [facility] 09/14/20 per report: LVEF 40-45%, LVEDd 6 7 cm, DD1, normal RV, severe LAE  -OP regimen: torsemide 60mg BID, metolazone 2 5mg MWF, K 20mEq BID; not on BB per EMR due to hypotension with Coreg?  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/RT  # FORD on CKD; resolved  -baseline Cr ~1 7        Plan:  1  Potassium replacement per MICU team  Agree with starting amiloride for potassium sparing  A consideration for switching amiloride to eplerenone can be made as OP as it has better data in the setting of HFrEF (patient did mention atypical "allergic" response to spironolactone)  2  Continue holding chronic diuretic therapy until K normalizes  Chronic oral K replacement therapy to be determined before discharge  3  If BP permits, consider restarting low dose beta blocker (e g  Toprol XL 25mg daily) as patient has multiple indications (CAD, HFrEF, PAF)   His Imdur may need to be further reduced to accomplish this with tenuous BP   4  Will follow with you             HPI: Kevon Pereyra 62y o  year old male with a history of ischemic CMP LVEF 45% with multiple exacerbations this year, CAD, ICD in situ (last shocked 04/2029 for hypokalemia when he took metolazone daily for 5 days), PAF, HTN, HLD, DM, JONNA, CKD, SCC of the tongue who presented to [facility] with multiple ICD discharges  Patient states he thought that he was filling up with fluid the last few days but felt relatively well overall  On 9/18 he was feeling odd and tried SL nitro but that didn't really help  On Sunday night he felt his ICD discharge multiple times and EMS was called  On route to the hospital and in the ED he continued to receive additional ICD therapy  He was hemodynamically stable at presentation  His lab work was significant for a K of 1 8, troponin peaking at 2 4, NTBNP 600s  He was started on amio and lido gtt and potassium replacement was given  His K is now at 3 0  There has been no recurrence of arrhythmia in the interim  Device was interrogated and showed 55 ICD discharges for VF, markedly reduced battery life  He currently feels well overall  Denies dizziness, palpitations, CP, dyspnea, orthopnea, PND  He was last admitted for a CHF exacerbation ~2 months ago and around that time his scheduled metolazone 2 5mg MWF was restarted  in addition to his torsemide 60mg BID  Per CareEverywhere he was also advised to take an increased dose of 5mg MWF earlier this month  He has had a recurrent issue with hypokalemia and received multiple ICD shocks in the setting of hypokalemia in April of this year  His dry weight is ~280-285 lb  Family History: non-contributory  Historical Information   No past medical history on file  No past surgical history on file    Social History   Social History     Substance and Sexual Activity   Alcohol Use Not on file     Social History     Substance and Sexual Activity   Drug Use Not on file     Social History     Tobacco Use   Smoking Status Not on file     Family History: No family history on file  Review of Systems:  Review of Systems  Except as noted in the HPI and above, a comprehensive 14 point review of systems was negative      Scheduled Meds:  Current Facility-Administered Medications   Medication Dose Route Frequency Provider Last Rate    acetaminophen  650 mg Oral Q4H PRN Alexis Currieing Check, MD      al mag oxide-diphenhydramine-lidocaine viscous  10 mL Swish & Swallow Q4H PRN Alexis Ramsey Check, MD      amiodarone  1 mg/min Intravenous Continuous Alexis Greening Check, MD 1 mg/min (09/21/20 0625)    ampicillin  2,000 mg Intravenous Q6H Drew C Check, MD 2,000 mg (09/21/20 1108)    aspirin  81 mg Oral Daily Alexis Greening Check, MD      atorvastatin  80 mg Oral Daily With [employer] C Check, MD      fentanyl citrate (PF)  25 mcg Intravenous Q1H PRN Alexis Greening Check, MD      ferrous sulfate  325 mg Oral Every Other Day Alexis Greening Check, MD      fish oil  1,000 mg Oral Daily Alexis Greening Check, MD      fluticasone  2 spray Each Nare Daily Alexis Greening Check, MD      gabapentin  600 mg Oral TID Alexis Greening Check, MD      insulin glargine  30 Units Subcutaneous Q12H [address] Altagracia Vann MD      insulin lispro  2-12 Units Subcutaneous TID AC Drew C Check, MD      insulin regular           isosorbide mononitrate  60 mg Oral Daily Drew C Check, MD      lidocaine in dextrose 5%  1 mg/min Intravenous Continuous Alexis Currieing Check, MD 1 mg/min (09/21/20 0625)    melatonin  6 mg Oral HS Alexis Greening Check, MD      oxyCODONE  5 mg Oral Q6H PRN Alexis Greening Check, MD      pantoprazole  40 mg Oral Early Morning Alexis Greening Check, MD      polyethylene glycol  17 g Oral Daily Alexis Greening Check, MD      rivaroxaban  20 mg Oral Daily With [employer] C Check, MD      senna  1 tablet Oral HS PRN Alexis Greening Check, MD       Continuous Infusions:amiodarone, 1 mg/min, Last Rate: 1 mg/min (09/21/20 0625)  lidocaine in dextrose 5%, 1 mg/min, Last Rate: 1 mg/min (09/21/20 5611)      PRN Meds:   acetaminophen    al mag oxide-diphenhydramine-lidocaine viscous    fentanyl citrate (PF)    oxyCODONE    senna  all current active meds have been reviewed    Allergies   Allergen Reactions    Shellfish-Derived Products     Spironolactone        Objective   Vitals: Blood pressure 109/62, pulse 62, temperature 97 7 °F (36 5 °C), temperature source Oral, resp  rate 18, height 6' (1 829 m), weight 132 kg (291 lb 14 2 oz), SpO2 100 %  , Body mass index is 39 59 kg/m² ,   Orthostatic Blood Pressures      Most Recent Value   Blood Pressure  109/62 filed at 09/21/2020 1300   Patient Position - Orthostatic VS  Lying filed at 09/21/2020 0800            Intake/Output Summary (Last 24 hours) at 9/21/2020 1404  Last data filed at 9/21/2020 1258  Gross per 24 hour   Intake 896 88 ml   Output 2575 ml   Net -1678 12 ml       Invasive Devices     Peripherally Inserted Central Catheter Line            PICC Line Right -- days          Peripheral Intravenous Line            Peripheral IV Right Antecubital -- days    Peripheral IV 09/20/20 Left Forearm less than 1 day    Peripheral IV 09/21/20 Left Forearm less than 1 day                Physical Exam:  Physical Exam  Vitals signs reviewed  Constitutional:       General: He is not in acute distress  Appearance: Normal appearance  He is not diaphoretic  HENT:      Head: Normocephalic and atraumatic  Mouth/Throat:      Mouth: Mucous membranes are moist    Eyes:      Extraocular Movements: Extraocular movements intact  Conjunctiva/sclera: Conjunctivae normal       Pupils: Pupils are equal, round, and reactive to light  Neck:      Musculoskeletal: Normal range of motion and neck supple  Comments: Unable to appreciate JVD/JVP due to body habitus  Cardiovascular:      Rate and Rhythm: Normal rate and regular rhythm  Pulses: Normal pulses  Heart sounds: Normal heart sounds  No murmur  No friction rub  No gallop      Pulmonary: Effort: Pulmonary effort is normal       Breath sounds: Normal breath sounds  No wheezing or rales  Abdominal:      General: Abdomen is flat  Palpations: Abdomen is soft  Musculoskeletal: Normal range of motion  Skin:     General: Skin is warm and dry  Capillary Refill: Capillary refill takes less than 2 seconds  Coloration: Skin is not pale  Comments: Trace edema   Neurological:      Mental Status: He is alert and oriented to person, place, and time  Psychiatric:         Mood and Affect: Mood normal          Behavior: Behavior normal          Lab Results: I have personally reviewed pertinent lab results  Imaging: I have personally reviewed pertinent reports  EKG: Personally reviewed    ECHO: personally reviewed  Previous Stress/Cath/PCI: personally reviewed  Code Status: Level 1 - Full Code  Advance Directive and Living Will:      Power of :    POLST:        Mckenzie Wright MD  Cardiology Fellow

## 2020-09-21 NOTE — EMTALA/ACUTE CARE TRANSFER
[address] [place] [address]  Dept: [phone]      EMTALA TRANSFER CONSENT    NAME Luisa Esparza                                         1960                              MRN [id number]    I have been informed of my rights regarding examination, treatment, and transfer   by Dr Yo Ribeiro MD    Benefits: Specialized equipment and/or services available at the receiving facility (Include comment)________________________    Risks: Potential for delay in receiving treatment, Potential deterioration of medical condition, Loss of IV, Increased discomfort during transfer      Transfer Request   I acknowledge that my medical condition has been evaluated and explained to me by the emergency department physician or other qualified medical person and/or my attending physician who has recommended and offered to me further medical examination and treatment  I understand the Hospital's obligation with respect to the treatment and stabilization of my emergency medical condition  I nevertheless request to be transferred  I release the Hospital, the doctor, and any other persons caring for me from all responsibility or liability for any injury or ill effects that may result from my transfer and agree to accept all responsibility for the consequences of my choice to transfer, rather than receive stabilizing treatment at the Hospital  I understand that because the transfer is my request, my insurance may not provide reimbursement for the services  The Hospital will assist and direct me and my family in how to make arrangements for transfer, but the hospital is not liable for any fees charged by the transport service  In spite of this understanding, I refuse to consent to further medical examination and treatment which has been offered to me, and request transfer to  [facility] Name, [address] : [facility]   I authorize the performance of emergency medical procedures and treatments upon me in both transit and upon arrival at the receiving facility  Additionally, I authorize the release of any and all medical records to the receiving facility and request they be transported with me, if possible  I authorize the performance of emergency medical procedures and treatments upon me in both transit and upon arrival at the receiving facility  Additionally, I authorize the release of any and all medical records to the receiving facility and request they be transported with me, if possible  I understand that the safest mode of transportation during a medical emergency is an ambulance and that the Hospital advocates the use of this mode of transport  Risks of traveling to the receiving facility by car, including absence of medical control, life sustaining equipment, such as oxygen, and medical personnel has been explained to me and I fully understand them  (MIGUEL CORRECT BOX BELOW)  [  ]  I consent to the stated transfer and to be transported by ambulance/helicopter  [  ]  I consent to the stated transfer, but refuse transportation by ambulance and accept full responsibility for my transportation by car  I understand the risks of non-ambulance transfers and I exonerate the Hospital and its staff from any deterioration in my condition that results from this refusal     X___________________________________________    DATE  20  TIME________  Signature of patient or legally responsible individual signing on patient behalf           RELATIONSHIP TO PATIENT_________________________          Provider Certification    NAME Roxanne Garcia                                        [facility] 1960                              MRN [id number]    A medical screening exam was performed on the above named patient  Based on the examination:    Condition Necessitating Transfer The encounter diagnosis was V-tach [facility])      Patient Condition: The patient has been stabilized such that within reasonable medical probability, no material deterioration of the patient condition or the condition of the unborn child(bry) is likely to result from the transfer    Reason for Transfer: Level of Care needed not available at this facility    Transfer Requirements: Facility [facility]   · Space available and qualified personnel available for treatment as acknowledged by    · Agreed to accept transfer and to provide appropriate medical treatment as acknowledged by       [place]  · Appropriate medical records of the examination and treatment of the patient are provided at the time of transfer   [address], [address] _______  · Transfer will be performed by qualified personnel from    and appropriate transfer equipment as required, including the use of necessary and appropriate life support measures      Provider Certification: I have examined the patient and explained the following risks and benefits of being transferred/refusing transfer to the patient/family:  General risk, such as traffic hazards, adverse weather conditions, rough terrain or turbulence, possible failure of equipment (including vehicle or aircraft), or consequences of actions of persons outside the control of the transport personnel, Unanticipated needs of medical equipment and personnel during transport, Risk of worsening condition, The possibility of a transport vehicle being unavailable, Consent was not obtained as patient is committed to psychiatric facility and transfer is mandated      Based on these reasonable risks and benefits to the patient and/or the unborn child(bry), and based upon the information available at the time of the patients examination, I certify that the medical benefits reasonably to be expected from the provision of appropriate medical treatments at another medical facility outweigh the increasing risks, if any, to the individuals medical condition, and in the case of labor to the unborn child, from effecting the transfer      X____________________________________________ DATE 09/21/20        TIME_______      ORIGINAL - SEND TO MEDICAL RECORDS   COPY - SEND WITH PATIENT DURING TRANSFER

## 2020-09-21 NOTE — H&P
H&P Exam - [address] [facility] [address] 62 y o  male MRN: [id number]  Unit/Bed#: MICU 02 Encounter: [id number]      -------------------------------------------------------------------------------------------------------------  Chief Complaint:  Ventricular tachycardia, chest pain    History of Present Illness     Miguel Angel Joaquin is a 62 y o  male with CHF, coronary artery disease, AFib on Xarelto, CKD, hypertension, hyperlipidemia, type 2 diabetes, JONNA, and squamous cell carcinoma of the tongue status post partial resection who presents to the ICU as a transfer from [facility] patient had innumerable episodes of V-tach at home and in the emergency department  While at [facility], patient was found to be hypokalemic  He was bolused with amiodarone as well as lidocaine and is currently on an amiodarone and lidocaine infusion  He received a total of 40 mEq of IV potassium as well as 40 mEq of p o  Potassium prior to arrival   Patient currently complains of chest and back pain as well as shortness of breath  Of note patient had recent admission to the hospital at [facility] on   He initially presented to the hospital at that time with complaints of neck pain and difficulty swallowing  On ED arrival at that time, the patient was noted to be febrile and tachycardic  Labs demonstrated leukocytosis and hyperglycemia as well as FORD and lactic acidosis  He was started on broad-spectrum antibiotics  One of 2 blood cultures were positive for strep  A PICC line was placed in the patient has been receiving IV ampicillin set to  on   Per Infectious Disease recommendations at that time, repeat blood cultures to be ordered following completion of antibiotics       History obtained from the patient and chart review  -------------------------------------------------------------------------------------------------------------  Assessment and Plan:    Neuro:    Diagnosis:  No active issues  o Plan:   o [facility]  o Delirium precautions   Diagnosis:  Analgesia  o Plan:   o Gabapentin t i d , Tylenol, oxycodone, fentanyl p r n  CV:    Diagnosis:  Ventricular tachycardia, hypertension, CAD, ischemic cardiomyopathy, history of atrial fibrillation, elevated troponin  - Echocardiogram performed on 09/14 demonstrated moderately decreased systolic function with ejection fraction of 40-45%, global hypokinesis, grade 1 diastolic dysfunction  - ICD in place  o Plan:   o Continue amiodarone and lidocaine GTT  o Electrophysiology consult  o Monitor electrolytes and replete as needed  o Continue home Lipitor, aspirin, and Xarelto  o Continue home Imdur, patient also takes metolazone every 3rd day and torsemide, will hold for now until dosing can be confirmed and hypokalemia resolves  o Elevated troponin likely secondary to tachycardia   No obvious ST segment elevation or depressions on EKG   Continued to trend      Pulm:   Diagnosis:  Obstructive sleep apnea  o Plan:   o BiPAP at night  o Maintain O2 saturation greater than 90%      GI:    Diagnosis:  GERD, constipation  o Plan:  Continue home Protonix  o Continue MiraLax daily, Senokot p r n        :    Diagnosis:  CKD  o Plan:   o Baseline creatinine 1 5-2 0  o Closely monitor BUN and creatinine  o Monitor urine output      F/E/N:    Fluids: NA   Electrolytes:  o Patient was significantly hypokalemic prior to arrival  - Recheck electrolytes and replete as needed  - He received a total of 80 mEq prior to arrival   Nutrition:   o NPO for now      Heme/Onc:    Diagnosis:  History of squamous cell carcinoma of the tongue status post partial resection  o Plan:   o Recent CT chest abdomen and pelvis performed at [facility] did not demonstrate any signs of metastatic disease  o Can continue to follow up as an outpatient      Endo:    Diagnosis:  Diabetes  - Hemoglobin A1c in April 7 7  o Plan:   o Insulin sliding scale  o Avoid hypoglycemia      ID:    Diagnosis:  Questionable Strep bacteremia  - 1/2 blood cultures positive at outside hospital  - Currently receiving ampicillin through PICC line  o Plan:   o Continue ampicillin for 1 more day to complete course  o Can recheck blood cultures at that time  o Patient is afebrile without significant leukocytosis      MSK/Skin:    Diagnosis:  No active issues  o Plan:  PT and OT when stable      Disposition: Admit to Stepdown Level 1  Code Status: Level 1 - Full Code  --------------------------------------------------------------------------------------------------------------  Review of Systems   Respiratory: Positive for shortness of breath  Cardiovascular: Positive for chest pain  Musculoskeletal: Positive for back pain  Neurological: Positive for light-headedness and headaches  A 12-point, complete review of systems was reviewed and negative except as stated above     Physical Exam  Vitals signs and nursing note reviewed  Constitutional:       General: He is not in acute distress  Appearance: He is obese  He is not ill-appearing  HENT:      Head: Normocephalic and atraumatic  Right Ear: External ear normal       Left Ear: External ear normal       Nose: Nose normal       Mouth/Throat:      Mouth: Mucous membranes are dry  Eyes:      Extraocular Movements: Extraocular movements intact  Conjunctiva/sclera: Conjunctivae normal       Pupils: Pupils are equal, round, and reactive to light  Neck:      Musculoskeletal: Normal range of motion and neck supple  Cardiovascular:      Rate and Rhythm: Normal rate  Rhythm irregular  Heart sounds: Normal heart sounds  No murmur  Pulmonary:      Effort: Pulmonary effort is normal  No respiratory distress  Breath sounds: Normal breath sounds  Abdominal:      Comments: Obese abdomen, distended but soft no rebound or guarding no tenderness   Musculoskeletal: Normal range of motion           General: No swelling, tenderness or deformity  Skin:     General: Skin is warm and dry  Capillary Refill: Capillary refill takes less than 2 seconds  Neurological:      General: No focal deficit present  Mental Status: He is alert and oriented to person, place, and time  Psychiatric:         Mood and Affect: Mood normal          Behavior: Behavior normal        --------------------------------------------------------------------------------------------------------------  Vitals:   Vitals:    09/21/20 0409 09/21/20 0500   BP:  98/57   Pulse:  68   Temp:  97 9 °F (36 6 °C)   TempSrc:  Oral   SpO2:  98%   Weight: 132 kg (291 lb 14 2 oz)      Temp  Min: 97 9 °F (36 6 °C)  Max: 98 4 °F (36 9 °C)  IBW: -88 kg     Body mass index is 39 59 kg/m²    N/A    Laboratory and Diagnostics:  Results from last 7 days   Lab Units 09/21/20 0427 09/21/20 0006 09/20/20  2357   WBC Thousand/uL 12 39*  --  15 34*   HEMOGLOBIN g/dL 11 8*  --  12 8   I STAT HEMOGLOBIN g/dl  --  13 9  --    HEMATOCRIT % 38 6  --  40 9   HEMATOCRIT, ISTAT %  --  41  --    PLATELETS Thousands/uL 259  --  343   NEUTROS PCT %  --   --  69   MONOS PCT %  --   --  9     Results from last 7 days   Lab Units 09/21/20 0427 09/21/20 0006 09/20/20  2357   SODIUM mmol/L 135*  --  137   POTASSIUM mmol/L 2 7*  --  1 8*   CHLORIDE mmol/L 96*  --  94*   CO2 mmol/L 32  --  28   CO2, I-STAT mmol/L  --  32  --    ANION GAP mmol/L 7  --  15*   BUN mg/dL 23  --  28*   CREATININE mg/dL 1 75*  --  2 15*   CALCIUM mg/dL 8 2*  --  8 6   GLUCOSE RANDOM mg/dL 228*  --  135   ALT U/L 29  --  28   AST U/L 28  --  23   ALK PHOS U/L 142*  --  148*   ALBUMIN g/dL 3 0*  --  3 3*   TOTAL BILIRUBIN mg/dL 0 35  --  0 33     Results from last 7 days   Lab Units 09/21/20  0427 09/20/20  2357   MAGNESIUM mg/dL 2 6 1 8   PHOSPHORUS mg/dL 3 4 1 7*      Results from last 7 days   Lab Units 09/20/20  2357   INR  1 97*   PTT seconds 34      Results from last 7 days   Lab Units 09/21/20  0427 09/20/20  2357   TROPONIN I ng/mL 2 05* 0 08*         ABG:    VBG:          Micro:        Imaging: I have personally reviewed pertinent reports  Historical Information   No past medical history on file  No past surgical history on file  Social History   Social History     Substance and Sexual Activity   Alcohol Use Not on file     Social History     Substance and Sexual Activity   Drug Use Not on file     Social History     Tobacco Use   Smoking Status Not on file     Family History:   No family history on file    Family history unknown and I have reviewed this patient's family history and commented on sigificant items within the HPI      Medications:  Current Facility-Administered Medications   Medication Dose Route Frequency    acetaminophen (TYLENOL) tablet 650 mg  650 mg Oral Q4H PRN    al mag oxide-diphenhydramine-lidocaine viscous (MAGIC MOUTHWASH) suspension 10 mL  10 mL Swish & Swallow Q4H PRN    amiodarone (CORDARONE) 900 mg in dextrose 5 % 500 mL infusion  1 mg/min Intravenous Continuous    ampicillin (OMNIPEN) 2,000 mg in sodium chloride 0 9 % 100 mL IVPB  2,000 mg Intravenous Q6H    aspirin chewable tablet 81 mg  81 mg Oral Daily    atorvastatin (LIPITOR) tablet 80 mg  80 mg Oral Daily With Dinner    fentanyl citrate (PF) 100 MCG/2ML 25 mcg  25 mcg Intravenous Q1H PRN    ferrous sulfate tablet 325 mg  325 mg Oral Every Other Day    fish oil capsule 1,000 mg  1,000 mg Oral Daily    fluticasone (FLONASE) 50 mcg/act nasal spray 2 spray  2 spray Each Nare Daily    gabapentin (NEURONTIN) capsule 600 mg  600 mg Oral TID    insulin lispro (HumaLOG) 100 units/mL subcutaneous injection 2-12 Units  2-12 Units Subcutaneous TID AC    isosorbide mononitrate (IMDUR) 24 hr tablet 60 mg  60 mg Oral Daily    lidocaine 2000 mg in 250 mL infusion (cardiac premix)- NOT FOR TREATMENT OF PAIN  1 mg/min Intravenous Continuous    melatonin tablet 6 mg  6 mg Oral HS    oxyCODONE (ROXICODONE) IR tablet 5 mg 5 mg Oral Q6H PRN    pantoprazole (PROTONIX) EC tablet 40 mg  40 mg Oral Early Morning    polyethylene glycol (MIRALAX) packet 17 g  17 g Oral Daily    potassium chloride 40 mEq IVPB (premix)  40 mEq Intravenous Once    rivaroxaban (XARELTO) tablet 20 mg  20 mg Oral Daily With Dinner    senna (SENOKOT) tablet 8 6 mg  1 tablet Oral HS PRN     Home medications:  Prior to Admission Medications   Prescriptions Last Dose Informant Patient Reported? Taking? MELATONIN PO   Yes No   Sig: Take 5 mg by mouth daily at bedtime   POTASSIUM CHLORIDE PO   Yes No   Sig: Take 20 mEq by mouth 2 (two) times a day   oxyCODONE (OXY-IR) 5 MG capsule   Yes No   Sig: Take 5 mg by mouth every 6 (six) hours as needed for moderate pain      Facility-Administered Medications: None     Allergies: Allergies   Allergen Reactions    Shellfish-Derived Products     Spironolactone      ------------------------------------------------------------------------------------------------------------  Advance Directive and Living Will:      Power of :    POLST:    ------------------------------------------------------------------------------------------------------------  Anticipated Length of Stay is > 2 midnights    Care Time Delivered:   32 minutes       Fior Sykes MD        Portions of the record may have been created with voice recognition software  Occasional wrong word or "sound a like" substitutions may have occurred due to the inherent limitations of voice recognition software    Read the chart carefully and recognize, using context, where substitutions have occurred

## 2020-09-21 NOTE — CONSULTS
Consultation - Nephrology   Onel Loyola 62 y o  male MRN: [id number]  Unit/Bed#: MICU 02 Encounter: [id number]      Assessment/Plan:  1  Recurrent hypokalemia would resume amiloride 10 mg daily, current diuretics are on hold with aggressive potassium replacement  2  VT storm, suspected secondary to significant hypokalemia with a potassium less than 2 0  3  Chronic kidney disease, stage III/4, baseline creatinine high 1s to low 2s  4  Cardiomyopathy with ejection fraction 40%  5  Atrial fibrillation  6  Obesity  7  Obstructive sleep apnea  8  Chronic hypotension, intolerant of spironolactone in the past Given labile blood pressure    Plan:  · Discussed with his outpatient nephrologist, Dr Trinity Rosales  · Patient has been intolerant of spironolactone in the past because of labile blood pressures  Has tolerated Amiloride, would resume at 5 mg b i d   · Continue with potassium replacement  · Loop diuretic as well as metolazone currently on hold  · Renal function appears fairly stable, baseline fluctuant given cardiomyopathy between high 1s low 2s  History of Present Illness   Physician Requesting Consult: Sarah Ku MD  Reason for Consult / Principal Problem:  Hypokalemia  HPI: Onel Loyola is a 62y o  year old male who presents with V-tach    Patient is a 80-year-old male who presents to [facility] with a normal episodes of VT  Patient was found to be significantly hypokalemic  Was given amiodarone as well as Lidocaine  Currently receiving IV and oral potassium  Potassium has improved to 2 7  Currently patient remains on lidocaine drip  Currently comfortable  No increasing shortness of breath, lying flat  No active chest pain or pressure  Denies abdominal pain  Urinating without difficulty  Again discussed with his outpatient nephrologist, Dr Trinity Rosales    Previously was on 10 mg of amiloride although this does not appear to be on his discharge LEs from his recent hospitalization at [facility]     Recent admission to [facility] with suspected sepsis was receiving ampicillin as an outpatient through 9/22  History obtained from chart review and the patient    Review of Systems   Constitutional: Negative for appetite change and unexpected weight change  Respiratory: Positive for chest tightness  Negative for shortness of breath  Cardiovascular: Positive for leg swelling  Negative for chest pain  Gastrointestinal: Positive for abdominal distention  Negative for abdominal pain  Genitourinary: Negative for difficulty urinating  Neurological: Positive for dizziness and light-headedness  Negative for syncope  Pertinent findings of a 10 point review of systems noted above otherwise all others negative        Historical Information      Past medical history:  Includes chronic kidney disease stage 3, ischemic cardiomyopathy, diabetes, peripheral neuropathy anemia, history of ventricular tachycardia status post AICD placement, history of hypokalemia, head & neck cancer    Past surgical history, AICD placement, status post right glossectomy and neck dissection    Family medical history significant for heart disease in mother, liver disease in his father and diabetes in the maternal grandmother    Apparent history of brain cancer - half sibling    Social history, light tobacco smoker quarter pack per day, quit September 4, 2018, denies alcohol use, denies any illicit drug use      Meds/Allergies   current meds:   Current Facility-Administered Medications   Medication Dose Route Frequency    acetaminophen (TYLENOL) tablet 650 mg  650 mg Oral Q4H PRN    al mag oxide-diphenhydramine-lidocaine viscous (MAGIC MOUTHWASH) suspension 10 mL  10 mL Swish & Swallow Q4H PRN    amiodarone (CORDARONE) 900 mg in dextrose 5 % 500 mL infusion  1 mg/min Intravenous Continuous    ampicillin (OMNIPEN) 2,000 mg in sodium chloride 0 9 % 100 mL IVPB  2,000 mg Intravenous Q6H    aspirin chewable tablet 81 mg  81 mg Oral Daily    atorvastatin (LIPITOR) tablet 80 mg  80 mg Oral Daily With Dinner    fentanyl citrate (PF) 100 MCG/2ML 25 mcg  25 mcg Intravenous Q1H PRN    ferrous sulfate tablet 325 mg  325 mg Oral Every Other Day    fish oil capsule 1,000 mg  1,000 mg Oral Daily    fluticasone (FLONASE) 50 mcg/act nasal spray 2 spray  2 spray Each Nare Daily    gabapentin (NEURONTIN) capsule 600 mg  600 mg Oral TID    insulin glargine (LANTUS) subcutaneous injection 30 Units 0 3 mL  30 Units Subcutaneous Q12H [address]    insulin lispro (HumaLOG) 100 units/mL subcutaneous injection 2-12 Units  2-12 Units Subcutaneous TID AC    isosorbide mononitrate (IMDUR) 24 hr tablet 60 mg  60 mg Oral Daily    lidocaine 2000 mg in 250 mL infusion (cardiac premix)- NOT FOR TREATMENT OF PAIN  1 mg/min Intravenous Continuous    melatonin tablet 6 mg  6 mg Oral HS    oxyCODONE (ROXICODONE) IR tablet 5 mg  5 mg Oral Q6H PRN    pantoprazole (PROTONIX) EC tablet 40 mg  40 mg Oral Early Morning    polyethylene glycol (MIRALAX) packet 17 g  17 g Oral Daily    potassium chloride 40 mEq IVPB (premix)  40 mEq Intravenous Once    rivaroxaban (XARELTO) tablet 20 mg  20 mg Oral Daily With Dinner    senna (SENOKOT) tablet 8 6 mg  1 tablet Oral HS PRN       Allergies   Allergen Reactions    Shellfish-Derived Products     Spironolactone          Objective   /62   Pulse 60   Temp 97 7 °F (36 5 °C) (Oral)   Resp 15   Ht 6' (1 829 m)   Wt 132 kg (291 lb 14 2 oz)   SpO2 100%   BMI 39 59 kg/m²     Intake/Output Summary (Last 24 hours) at 9/21/2020 1200  Last data filed at 9/21/2020 1001  Gross per 24 hour   Intake 896 88 ml   Output 2050 ml   Net -1153 12 ml       Current Weight: Weight - Scale: 132 kg (291 lb 14 2 oz)    Physical Exam  Constitutional:       Appearance: Normal appearance  He is obese  HENT:      Head: Normocephalic and atraumatic     Eyes:      General: No scleral icterus  Cardiovascular:      Rate and Rhythm: Normal rate  Rhythm irregular  Pulmonary:      Effort: Pulmonary effort is normal       Breath sounds: Normal breath sounds  Abdominal:      General: There is distension  Palpations: Abdomen is soft  Tenderness: There is no abdominal tenderness  Musculoskeletal:         General: No deformity  Right lower leg: Edema present  Left lower leg: Edema present  Lymphadenopathy:      Cervical: No cervical adenopathy  Skin:     General: Skin is warm and dry  Findings: No rash  Neurological:      Mental Status: He is alert and oriented to person, place, and time     Psychiatric:         Mood and Affect: Mood normal            Lab Results:    Results from last 7 days   Lab Units 09/21/20  0427   WBC Thousand/uL 12 39*   HEMOGLOBIN g/dL 11 8*   HEMATOCRIT % 38 6   PLATELETS Thousands/uL 259     Results from last 7 days   Lab Units 09/21/20 0427 09/21/20  0006   POTASSIUM mmol/L 2 7*  --    CHLORIDE mmol/L 96*  --    CO2 mmol/L 32  --    CO2, I-STAT mmol/L  --  32   BUN mg/dL 23  --    CREATININE mg/dL 1 75*  --    GLUCOSE, ISTAT mg/dl  --  167*   CALCIUM mg/dL 8 2*  --

## 2020-09-21 NOTE — CONSULTS
Consultation - Electrophysiology Cardiology (EP)   Jose Alfredo Cole 62 y o  male MRN: [id number]  Unit/Bed#: MICU 02 Encounter: [id number]            Inpatient consult to Electrophysiology     Performed by  Anitha Mirza MD     Authorized by Lottie Grimes MD            PCP: No primary care provider on file  Outpatient Cardiologist: Naveen Stacy MD    Assessment/Plan     Assessment:  Principal Problem:    Ventricular tachycardia ([address] )  Active Problems:    Hypokalemia      1  Ventricular fibrillation times 55 ICD shocks  -likely etiologies hypokalemia in the setting of metolazone resumption  -R on T phenomenon preceding each episode  -battery life documented 7 4 years as of June, down to 16 months on 9/21 interrogation  -amiodarone and lidocaine drips  -K 1 8 -> 2 7 s/p 240 mEq of KCl so far  -no further recurrence on telemetry at [facility]    2  Mixed ischemic/nonischemic cardiomyopathy  -known CAD, alcohol use, JONNA on CPAP for [facility] notes  -4/2019 Lexiscan:  Mid inferior/inferolateral infarct, gated EF 34%, dilated and inferior akinesis  -09/2019 TTE:  LVEF 50%, LVIDd 6 4 cm, normal RV size/function, LA 4 7 cm; no significant valvulopathy  -CAD: Aspirin, atorvastatin, isosorbide mononitrate  -CHF:  Home torsemide held, not on beta-blockers or Ace as per last [facility] note  -presenting NT-proBNP 614, can be falsely low due to obesity    3  Paroxysmal atrial fibrillation  -currently in sinus mechanism  -[id number] 4, HAS-BLED 2  -LA 4 7 cm  -rivaroxaban, amiodarone infusion      Plan:  1  Based on the information available, patient likely having hypokalemia in the setting of metolazone resumption  He has a history of hypokalemia-induced ventricular arrhythmia requiring defibrillating shock  Device interrogation revealed R on T polymorphic VTach and VFib  Currently well controlled on amiodarone and lidocaine drips        2  If no contraindication, consider initiation of low-dose beta blockers with metoprolol tartrate 12 5 mg b i d  Is though patient has not been on beta-blockers 4 months due to hypertension  3  Consider nephrology consultation to help ascertain potassium intake and output to help prevent further recurrence  4  Heart failure was consulted to help optimize guideline directed medical therapy as well  5  Patient's battery life has significantly been drained by this episode (from 7 4 years to 16 months left)  Daily interrogations to follow for the next few days  He is to follow with his primary cardiologist soon after discharge to help coordinate  timing of degenerative change  Case discussed and reviewed with Dr Jesse Luna pending attending addendum  Thank you for involving us in the care of your patient  History of Present Illness   Physician Requesting Consult: Satinder Daley MD  Reason for Consult / Principal Problem: AICD defibrillating shocks    HPI: Jazmin Barnett is a 62y o  year old male with a history of mixed nonischemic/ischemic cardiomyopathy with improved EF 50%, [employer] Visia SC-ICD (implanted 11/2017, last shock 04/2020, hypoK related), CAD with RCA chronic total occlusion, atrial fibrillation on rivaroxaban, hypertension, dyslipidemia, CKD stage IIIB (baseline Cr 2), alcohol abuse, class 2 obesity, JONNA on CPAP and metastatic SCC (resection, XRT & cetuximab) who presented to [facility] as a transfer from [facility] with innumerable ICD shocks  On initial presentation to [facility], patient was normotensive 117/75, HR 94, breathing and saturating 99% on 2 L NC  Initial lab work revealed grossly stable renal function but significant electrolyte abnormalities (K 1 8, Mg 1 8), leukocytosis 15 3, troponin elevation up to 2 05, NT-proBNP 614, subtherapeutic INR 1 97, TSH 5, FT4 1 22  CXR revealed cardiomegaly with mild vascular congestion  Initial ECG showed sinus rhythm with ventricular bigeminy, premature atrial contraction and fusion beats    Subsequent 1 showed bigeminy and eventually resumption of sinus rhythm  Patient was initiated on amiodarone and lidocaine drip and transferred to [place] for further management  On interview, patient reports that he initially felt weird on  and took nitroglycerin just to be safe  He was asymptomatic until  night when he receive a defibrillating shock which continued for total of about 10  He contacted EMS, in route to the hospital, there was another 10 shocks  In the ED, he had multiple shocks as well  He denies any preceding chest pain, diaphoresis, nausea, vomiting, diarrhea or any new medications  He was restarted on metolazone after an acute decompensated failure admission earlier this month at [facility]  He had an episode of defibrillating shock x2 in 2020 after taking metolazone for 5 days straight  However, due to his recent volume overload, the medication had to be resumed at a greater interval   Furthermore, his insulin was increased from 44 to 50 units  Only new medication has been 2 week course of IV ampicillin which he is completing tomorrow  Aside from chest soreness, patient overall feels comfortable  Device interrogation revealed a setting of VVI 40 with no post shock pacing  There were 55 recorded defibrillating shock for ventricular fibrillation episodes        TELEMETRY: personally reviewed by myself Libertad Simmons MD sinus rhythm with heart rate ranging 60-70 with no ventricular ectopy    DEVICE INTERROGATION:  [facility]  20 device check:  Routine remote ICD interrogation  Presenting rhythm: VS  Battery status: Approximately 7 4 years remaining on the battery with acceptable charge time  Lead parameters: lead measurements remain stable  Thoracic impedance: wnl  Episode summary: 1 VHR episode suggesting NSVT lasting ~3 sec in duration, no therapies delivered  <0 1% V-paced  Appropriately functioning device      EC:54 PM      12:41 AM      1:09 AM      8:29 AM      Review of Systems  ROS as noted above in HPI  Historical Information   No past medical history on file  No past surgical history on file  Social History     Substance and Sexual Activity   Alcohol Use Not on file     Social History     Substance and Sexual Activity   Drug Use Not on file     Social History     Tobacco Use   Smoking Status Not on file     Family History: No family history on file      Meds/Allergies   Hospital Medications:   Current Facility-Administered Medications   Medication Dose Route Frequency    acetaminophen (TYLENOL) tablet 650 mg  650 mg Oral Q4H PRN    al mag oxide-diphenhydramine-lidocaine viscous (MAGIC MOUTHWASH) suspension 10 mL  10 mL Swish & Swallow Q4H PRN    amiodarone (CORDARONE) 900 mg in dextrose 5 % 500 mL infusion  1 mg/min Intravenous Continuous    ampicillin (OMNIPEN) 2,000 mg in sodium chloride 0 9 % 100 mL IVPB  2,000 mg Intravenous Q6H    aspirin chewable tablet 81 mg  81 mg Oral Daily    atorvastatin (LIPITOR) tablet 80 mg  80 mg Oral Daily With Dinner    fentanyl citrate (PF) 100 MCG/2ML 25 mcg  25 mcg Intravenous Q1H PRN    ferrous sulfate tablet 325 mg  325 mg Oral Every Other Day    fish oil capsule 1,000 mg  1,000 mg Oral Daily    fluticasone (FLONASE) 50 mcg/act nasal spray 2 spray  2 spray Each Nare Daily    gabapentin (NEURONTIN) capsule 600 mg  600 mg Oral TID    insulin lispro (HumaLOG) 100 units/mL subcutaneous injection 2-12 Units  2-12 Units Subcutaneous TID AC    isosorbide mononitrate (IMDUR) 24 hr tablet 60 mg  60 mg Oral Daily    lidocaine 2000 mg in 250 mL infusion (cardiac premix)- NOT FOR TREATMENT OF PAIN  1 mg/min Intravenous Continuous    melatonin tablet 6 mg  6 mg Oral HS    oxyCODONE (ROXICODONE) IR tablet 5 mg  5 mg Oral Q6H PRN    pantoprazole (PROTONIX) EC tablet 40 mg  40 mg Oral Early Morning    polyethylene glycol (MIRALAX) packet 17 g  17 g Oral Daily    potassium chloride 40 mEq IVPB (premix)  40 mEq Intravenous Once    rivaroxaban (XARELTO) tablet 20 mg  20 mg Oral Daily With Dinner    senna (SENOKOT) tablet 8 6 mg  1 tablet Oral HS PRN     Home Medications:   Medications Prior to Admission   Medication    MELATONIN PO    oxyCODONE (OXY-IR) 5 MG capsule    POTASSIUM CHLORIDE PO       Allergies   Allergen Reactions    Shellfish-Derived Products     Spironolactone        Objective   Vitals: Blood pressure (!) 88/47, pulse 62, temperature 97 9 °F (36 6 °C), temperature source Oral, resp  rate 17, weight 132 kg (291 lb 14 2 oz), SpO2 99 %  Orthostatic Blood Pressures      Most Recent Value   Blood Pressure  (!) 88/47 filed at 09/21/2020 0800            Intake/Output Summary (Last 24 hours) at 9/21/2020 0855  Last data filed at 9/21/2020 0801  Gross per 24 hour   Intake 815 28 ml   Output 2050 ml   Net -1234 72 ml       Invasive Devices     Peripherally Inserted Central Catheter Line            PICC Line Right -- days          Peripheral Intravenous Line            Peripheral IV Right Antecubital -- days    Peripheral IV 09/20/20 Left Forearm less than 1 day    Peripheral IV 09/21/20 Left Forearm less than 1 day                Physical Exam    GEN: Camila Oliver appears comfortable, alert and oriented x 3, pleasant and cooperative   HEENT:  Normocephalic, atraumatic, anicteric, moist mucous membranes  NECK: No JVD or carotid bruits   HEART: Regular rhythm, normal rate, normal S1 and S2, no murmurs, clicks, gallops or rubs   LUNGS: Clear to auscultation bilaterally; no wheezes, rales, or rhonchi; respiration nonlabored   ABDOMEN:  Normoactive bowel sounds, soft, no tenderness, no distention  EXTREMITIES: peripheral pulses palpable; no edema  NEURO: no gross focal findings; cranial nerves grossly intact   SKIN:  Dry, intact, warm to touch    Lab Results: I have personally reviewed pertinent lab results      Lab Results   Component Value Date    CKTOTAL 160 02/24/2014    CKTOTAL 130 02/24/2014 CKTOTAL 148 02/23/2014    TROPONINI 2 05 (H) 09/21/2020    TROPONINI 0 08 (H) 09/20/2020    TROPONINI 0 04 02/24/2014       Lab Results   Component Value Date    GLUCOSE 167 (H) 09/21/2020    CALCIUM 8 2 (L) 09/21/2020     04/26/2014    K 2 7 (LL) 09/21/2020    CO2 32 09/21/2020    CL 96 (L) 09/21/2020    BUN 23 09/21/2020    CREATININE 1 75 (H) 09/21/2020       Lab Results   Component Value Date    WBC 12 39 (H) 09/21/2020    HGB 11 8 (L) 09/21/2020    HCT 38 6 09/21/2020    MCV 82 09/21/2020     09/21/2020     Results from last 7 days   Lab Units 09/20/20  2357   INR  1 97*       No results found for: CHOL  No results found for: HDL  No results found for: LDLCALC  No results found for: TRIG    Lab Results   Component Value Date    ALT 29 09/21/2020    AST 28 09/21/2020     No results found for: TSH    Imaging: I have personally reviewed pertinent reports  and I have personally reviewed pertinent films in PACS      ECHO:  Echo [address]  9/12/2019     CONCLUSIONS    Definity Echo Contrast was utilized  Left ventricle is moderately dilated  Normal left ventricular wall thickness  Abnormal (paradoxical) septal motion consistent with RV pacemaker  Overall low-normal LV systolic function, 32-27%        Normal right ventricular size and function  ICD wire seen in right ventricle        No significant valve abnormalities        The pulmonary artery pressure could not be calculated due to an     incomplete tricuspid regurgitant gradient measurement  Mild diastolic dysfunction with normal filling pressures          No significant change since the prior study of 11/8/18  Visually Estimated LV Ejection Fraction is:50%     LEFT VENTRICLE   Left ventricle is moderately dilated  Normal left ventricular wall thickness  Abnormal (paradoxical) septal motion consistent with RV pacemaker  Overall low-normal LV systolic function, 58-94%        RIGHT VENTRICLE   Normal right ventricular size and function  ICD wire seen in right ventricle  LEFT ATRIUM    Normal left atrial size  RIGHT ATRIUM    Normal right atrial size  AORTA    Normal aortic root for body surface area  PERICARDIUM    Normal pericardium without effusion  VEINS    Inferior vena cava not well visualized  AORTIC VALVE    The aortic valve is not well visualized  Aortic sclerosis without stenosis  MITRAL VALVE    Structurally normal mitral valve  No mitral stenosis  Trace mitral     regurgitation  TRICUSPID VALVE    Structurally normal tricuspid valve without significant stenosis or     regurgitation  Emil Alona PULMONIC VALVE    The pulmonic valve is not well visualized  No pulmonic stenosis  No     pulmonic insufficiency  DOPPLER HEMODYNAMICS    The pulmonary artery pressure could not be calculated due to an     incomplete tricuspid regurgitant gradient measurement  Mild diastolic dysfunction with normal filling pressures  Electronically signed:  Calin Hassan MD                        STRESS TEST:  NM STRESS TEST - [facility] - [address] [place] [address]    Indeterminate stress EKG due to pharmacologic protocol      Myocardial perfusion imaging is abnormal:    1  No evidence of significant ischemia  2  There is large in size, severe, fixed perfusion defect of the     basal to mid inferior/inferolateral walls with akinesis on gated     SPECT imaging, consistent with infarction       Dilated LV size with moderately reduced LV systolic function      Global hypokinesis with inferior akinesis      Calculated LVEF = 34%      Co-read by Dr Izabella Jenkins DO   Fellow:   Miranda Gonzalez MD      Electronically signed by:   Man Beth MD     Anticoagulation VTE Prophylaxis:  Rivaroxaban      Counseling / Coordination of Care  Total floor / unit time spent today 45 minutes minutes    Greater than 50% of total time was spent with the patient and / or family counseling and / or coordination of care  A description of the counseling / coordination of care: consultation  Epic/ Allscripts/Care Everywhere records reviewed: yes    ** Please Note: Fluency DirectDictation voice to text software may have been used in the creation of this document   **

## 2020-09-21 NOTE — ED PROVIDER NOTES
History  Chief Complaint   Patient presents with    Pacemaker Problem     30x shocked by pacemaker, pt family called 46, pt has infection in throat and is receiving abx long term and has PICC line     HPI       The patient is a 62 yom who presents with over 20 episodes of vtach at home  Patiuent initially treated with mag/amio  This helped for a bit but then he started having episodes again  Many episodes - at least 20 -  Of vtach here  All resulting in him being shocked and converting back  Discussed multiple times with Dr Julian Sifuentes, final treatment regimen is now repletion of all his electrolyte abnormalities -K/Phos/Mag and Amio/Lido gtt  Spoke with Dr Lazaro Lewis from our ICU who reccomends [facility]      Discussed with Dr Carisa Eli, critical care [facility]  Will treat with propranolol if needed  Of note, no fevers, chills, sweats, nausea, vomiting, diarrhea  Patient does report that he was recently admitted and discharged from the hospital   He does have a history of oral cancer  See notes below for discussion of recent hospital admission at [facility]     Medical decision makin-year-old male, innumerable episodes of V-tach at home and here  Electrical storm  Likely due to significant electrolyte disturbances  Possibly consider cardiac irritation from PICC line  Eventually able to control patient's V-tach episodes with multiple electrolyte replacements as well as multiple antiarrhythmics  Otherwise, will transfer to [facility]  REVIEWED PRIOR NOTES, SEE BELOW FOR RECENT RELEVANT NOTES  ===================================================================           Hospital Course  This is a brief description of the patient's hospital stay; please refer to medical chart for further details  Mr Tevin Vazquez is a 62year old male with a past medical history of HFrEF with recovery in EF s/p ICD, CAD, A   Fib on Xarelto, CKD, HTN, HLD, T2DM, JONNA, and history of squamous cell carcinoma of the tongue s/p partial resection who presented to [facility] on 09/09/2020 with complaints of neck pain and difficulty swallowing  Patient admitted to cold-like symptoms including a sore throat ongoing the week before admission with multiple sick family members  The pain worsened prompting him to present to the ED  On arrival, he was febrile and tachycardiac  Lab work was significant for a leukocytosis, hyperglycemia, FORD, hypokalemia, and mild lactic acidosis  CXR with atelectasis  CT Head with no acute abnormality  CT neck with thickening of the epiglottis and area of the glottic folds slightly progressed from prior studies  CT C/A/P with no acute changes or evidence of metastatic disease  He was started on broad spectrum antibiotics and  insulin gtt  Antibiotics were switched to Unasyn and ENT was consulted  Flexible laryngoscopy with mild arytenoid edema and mild epiglottis edema without tumor, mass, or ulcers  Blood cultures grew 1/2 strep dysgalactiae subspecies Equisimilis and ID was consulted  Antibiotics were switched to Ampicillin for planned 14 day course  RVP was also positive for Rhinovirus  Repeat Blood cultures with no growth to date  US of the neck with edema  2D Echo ordered, which revealed no suspicion for endocarditis  PICC line was placed  ID ordered OPAT for 2 weeks of ampicillin  Home health care was set up  Pt was planned to continue IV abx and PICC until 9/22/20  Recommended f/u w/ ID afterwards, as well as repeat blood cultures once abx finished  Pt advised to f/u ID and PCP      Admission Diagnoses   Hypokalemia [E87 6]  Neck pain [M54 2]  Hyperglycemia [R73 9]  Blurred vision, left eye [H53 8]  Sepsis ([address] ) [A41 9]  Leukocytosis, unspecified type [D72 829]  Dysphagia, unspecified type [R13 10]  Chest pain, unspecified type [R07 9]  Sepsis, due to unspecified organism, unspecified whether acute organ dysfunction present ([address] ) [A41 9]    Discharge Diagnoses  Principal Problem:  Streptococcal bacteremia  Active Problems:  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  Iron deficiency anemia  Hyperlipidemia  Obstructive sleep apnea  Uncontrolled type 2 diabetes mellitus with nephropathy ([facility] )  Coronary artery disease involving native coronary artery of native heart with other form of angina pectoris (HCC)  Paroxysmal atrial fibrillation (HCC)  Gastroesophageal reflux disease, esophagitis presence not specified  ICD (implantable cardioverter-defibrillator) in place  Primary tongue squamous cell carcinoma (HCC)  Rhinovirus infection  Hypokalemia  Anxiety  FORD (acute kidney injury) ([facility] )  Sepsis ([facility] )  Pharyngitis  Chronic HFrEF (heart failure with reduced ejection fraction) ([place] [id number] )              Subjective:     Shantel Coreas is a very pleasant 30-year-old gentleman with past medical history significant for CAD and ischemic cardiomyopathy  ,  Ejection fraction 50% per echo 09/2019   His RCA is a    He has history of significant alcohol abuse in the past and his cardiomyopathy is likely mixed, ischemic and nonischemic   He also has morbid obesity  Jose Dkat  has had surgery done for 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 along Cetuximab therapy and RT  He came today to see me for previously scheduled visit  However he mentioned that he has been really feeling terrible today  He does not have any energy and feels very tired and fatigued  I noticed that his blood pressure was 90/58  Generally he maintains a systolic blood pressure above 100 mmHg  Patient was struggling with low blood pressure issues in the recent past and I have to discontinue his carvedilol  As of now he is not taking any antihypertensive medication  However he is taking a lot of diuretics  He also takes very high-dose Imdur because of chronic chest pain which is somewhat atypical     Patient also mentioned that he takes Neurontin and tramadol for his leg pain      Patient still has good overall functionality  Currently he is taking torsemide 60 mg p o  twice daily  He takes metolazone 2 5 mg every third day  In addition, he also takes a mile ride 1 tablet daily  Quite recently I advised him to take 5 mg of metolazone instead of 2 5 mg  That has helped him tremendously and he was able to lose significant amount of fluid weight  He denies any shortness of breath today  The following portions of the patient's history were reviewed and updated as appropriate: allergies, current medications, past family history, past medical history, past social history, past surgical history and problem list     Review of Systems   Constitutional: Negative for fatigue  HENT: Negative for nosebleeds  Respiratory: Negative for cough and shortness of breath  Cardiovascular: Negative for chest pain, leg swelling and palpitations  Gastrointestinal: Negative for abdominal pain, diarrhea and nausea  Genitourinary: Negative for hematuria  Skin: Negative for color change and rash  Neurological: Negative for confusion, dizziness, syncope, tremors and weakness  Mental Health: Negative for behavioral problem  Objective:   BP 90/58 (BP Location: Left arm, Patient Position: Sitting, BP Cuff Size: Large Adult)  Pulse 66  Temp 96 1 °F (35 6 °C) (Temporal)  Resp 18  Ht 1 778 m (5' 10")  Wt 134 kg (294 lb 8 oz)  BMI 42 26 kg/m²   Physical Exam  Constitutional:   Appearance: He is well-developed and well-nourished  HENT:   Head: Normocephalic and atraumatic  Eyes:   Extraocular Movements: EOM normal    Pupils: Pupils are equal, round, and reactive to light  Neck:   Musculoskeletal: Normal range of motion and neck supple  Cardiovascular:   Rate and Rhythm: Normal rate and regular rhythm  Pulses: Intact distal pulses  Heart sounds: Normal heart sounds  Pulmonary:   Effort: Pulmonary effort is normal    Breath sounds: Normal breath sounds     Abdominal:   General: Bowel sounds are normal    Palpations: Abdomen is soft  Musculoskeletal: Normal range of motion  Neurological:   Mental Status: He is alert and oriented to person, place, and time  Skin:  General: Skin is warm and dry  Psychiatric:   Mood and Affect: Mood and affect normal    Behavior: Behavior normal      Lab Review   Lab Results   Component Value Date    (L) 08/12/2020   K 3 8 08/12/2020   CL 95 (L) 08/12/2020   CO2 31 08/12/2020   BUN 41 (H) 08/12/2020   CREATININE 2 09 (H) 08/12/2020   GLUCOSE 202 (H) 08/12/2020   CALCIUM 8 8 08/12/2020     Lab Results   Component Value Date   CHOL 157 04/22/2020   TRIG 107 04/22/2020   HDL 41 04/22/2020   LDLDIRECT 160 (H) 10/24/2016         Assessment/Plan:     Problem List     Cardiovascular and Mediastinum   Coronary artery disease involving native coronary artery of native heart with other form of angina pectoris (HCC)   Overview   Cont aspirin, nitro prn  No recent angina episodes  Relevant Medications   omega-3 fatty acids-vitamin E (FISH OIL) 1,000 mg cap   nitroglycerin (NITROSTAT) 0 4 MG SL tablet   aspirin 81 MG chewable tablet   atorvastatin (LIPITOR) 80 MG tablet   amiodarone (PACERONE) 200 MG tablet   isosorbide mononitrate (IMDUR) 120 MG 24 hr tablet   torsemide (DEMADEX) 20 MG tablet   metOLazone (ZAROXOLYN) 2 5 MG tablet   Chronic diastolic (congestive) heart failure (HCC) - Primary   Current Assessment & Plan   Patient does not appear volume overloaded  His low blood pressure could be secondary to overdiuresis related to the use of 5 mg of metolazone instead of 2 5 mg  Although it has helped him lose fluid weight, his blood pressure now has been staying on the lower side and he feels tired and exhausted  I have asked him to skip his scheduled Metolazone 2 5 mg tomorrow i e  on Friday  He will take the next dose of metolazone next week either Monday or Tuesday based on his weight  I have told him not to take 5 mg of metolazone unless I advise him to do so  This gentleman has developed CKD stage III/IV and I believe the use of diuretics is also contributing to it  I will check a BMP next week  He has been taking potassium supplements regularly  I also have decided to decrease the dose of Imdur from 120 mg to 60 mg daily  Follow-up in 2 months  Relevant Medications   omega-3 fatty acids-vitamin E (FISH OIL) 1,000 mg cap   nitroglycerin (NITROSTAT) 0 4 MG SL tablet   aspirin 81 MG chewable tablet   atorvastatin (LIPITOR) 80 MG tablet   amiodarone (PACERONE) 200 MG tablet   isosorbide mononitrate (IMDUR) 120 MG 24 hr tablet   torsemide (DEMADEX) 20 MG tablet   metOLazone (ZAROXOLYN) 2 5 MG tablet   Other Relevant Orders   BASIC METABOLIC PANEL     Genitourinary   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 Taking? MELATONIN PO   Yes Yes   Sig: Take 5 mg by mouth daily at bedtime   POTASSIUM CHLORIDE PO   Yes Yes   Sig: Take 20 mEq by mouth 2 (two) times a day   oxyCODONE (OXY-IR) 5 MG capsule   Yes Yes   Sig: Take 5 mg by mouth every 6 (six) hours as needed for moderate pain      Facility-Administered Medications: None       No past medical history on file  No past surgical history on file  No family history on file  I have reviewed and agree with the history as documented  No existing history information found  No existing history information found  Social History     Tobacco Use    Smoking status: Not on file   Substance Use Topics    Alcohol use: Not on file    Drug use: Not on file       Review of Systems   Constitutional: Negative for chills and fever  HENT: Negative for ear pain and hearing loss      Respiratory: Positive for chest tightness  Negative for shortness of breath  Cardiovascular: Positive for palpitations  Negative for chest pain and leg swelling  Gastrointestinal: Negative for abdominal pain, diarrhea and nausea  Genitourinary: Negative for dysuria and hematuria  Musculoskeletal: Negative for joint swelling and neck stiffness  Skin: Negative for rash  Neurological: Positive for light-headedness  Negative for seizures and headaches  Psychiatric/Behavioral: Negative for hallucinations and suicidal ideas  All other systems reviewed and are negative  Physical Exam  Physical Exam  Vitals signs and nursing note reviewed  Constitutional:       Appearance: He is well-developed  He is not diaphoretic  HENT:      Head: Normocephalic and atraumatic  Eyes:      Pupils: Pupils are equal, round, and reactive to light  Neck:      Musculoskeletal: Normal range of motion and neck supple  Cardiovascular:      Rate and Rhythm: Normal rate and regular rhythm  Heart sounds: Normal heart sounds  No murmur  Pulmonary:      Effort: Pulmonary effort is normal  No respiratory distress  Breath sounds: Normal breath sounds  No wheezing  Abdominal:      General: Bowel sounds are normal  There is no distension  Palpations: Abdomen is soft  Tenderness: There is no abdominal tenderness  Musculoskeletal: Normal range of motion  General: No tenderness  Skin:     General: Skin is warm and dry  Findings: No erythema  Neurological:      General: No focal deficit present  Mental Status: He is alert and oriented to person, place, and time        Coordination: Coordination normal    Psychiatric:         Mood and Affect: Mood normal          Behavior: Behavior normal          Vital Signs  ED Triage Vitals   Temperature Pulse Respirations Blood Pressure SpO2   09/20/20 2353 09/20/20 2353 09/20/20 2353 09/21/20 0040 09/20/20 2353   98 4 °F (36 9 °C) 103 20 117/75 98 %      Temp Source Heart Rate Source Patient Position - Orthostatic VS BP Location FiO2 (%)   09/20/20 2353 09/20/20 2353 09/20/20 2353 09/20/20 2353 --   Oral Monitor Lying Left arm       Pain Score       09/21/20 0145       Worst Possible Pain           Vitals:    09/21/20 0040 09/21/20 0145 09/21/20 0200 09/21/20 0300   BP: 117/75 134/67 141/61 103/53   Pulse: 92 94 94 82   Patient Position - Orthostatic VS: Lying Lying Lying Lying         Visual Acuity      ED Medications  Medications   potassium chloride 20 mEq IVPB (premix) (20 mEq Intravenous New Bag 9/21/20 0228)   amiodarone (CORDARONE) 900 mg in dextrose 5 % 500 mL infusion (1 mg/min Intravenous Canceled Entry 9/21/20 0039)   sodium phosphate 21 mmol in dextrose 5 % 250 mL Infusion (21 mmol Intravenous New Bag 9/21/20 0058)   lidocaine 2000 mg in 250 mL infusion (cardiac premix)- NOT FOR TREATMENT OF PAIN (1 mg/min Intravenous New Bag 9/21/20 0146)   potassium chloride 40 mEq IVPB (premix) (has no administration in time range)   magnesium sulfate IVPB (premix) SOLN 1 g (0 g Intravenous Stopped 9/21/20 0003)   fentanyl citrate (PF) 100 MCG/2ML 25 mcg (25 mcg Intravenous Given 9/20/20 2358)   amiodarone 150 mg/3 mL injection **ADS Override Pull** (  Given to EMS 9/21/20 0038)   potassium chloride (K-DUR,KLOR-CON) CR tablet 20 mEq (20 mEq Oral Given 9/21/20 0011)   magnesium sulfate IVPB (premix) SOLN 1 g (0 g Intravenous Stopped 9/21/20 0010)   lidocaine (cardiac) injection 100 mg (100 mg Intravenous Given 9/21/20 0129)   fentanyl citrate (PF) 100 MCG/2ML 25 mcg (25 mcg Intravenous Given 9/21/20 0133)   magnesium sulfate IVPB (premix) SOLN 1 g (1 g Intravenous New Bag 9/21/20 0158)   potassium chloride (K-DUR,KLOR-CON) CR tablet 20 mEq (20 mEq Oral Given 9/21/20 0217)   fentanyl citrate (PF) 100 MCG/2ML 50 mcg (50 mcg Intravenous Given 9/21/20 0240)       Diagnostic Studies  Results Reviewed     Procedure Component Value Units Date/Time    UA w Reflex to Microscopic w Reflex to Culture [[id number]] Collected:  09/21/20 0117    Lab Status:  Final result Specimen:  Urine Updated:  09/21/20 0117     Color, UA Yellow     Clarity, UA Clear     Specific Gravity, UA 1 010     pH, UA 7 0     Leukocytes, UA Negative     Nitrite, UA Negative     Protein, UA Negative mg/dl      Glucose, UA Negative mg/dl      Ketones, UA Negative mg/dl      Urobilinogen, UA 0 2 E U /dl      Bilirubin, UA Negative     Blood, UA Negative    Comprehensive metabolic panel [[id number]]  (Abnormal) Collected:  09/20/20 2357    Lab Status:  Final result Specimen:  Blood from Arm, Right Updated:  09/21/20 0028     Sodium 137 mmol/L      Potassium 1 8 mmol/L      Chloride 94 mmol/L      CO2 28 mmol/L      ANION GAP 15 mmol/L      BUN 28 mg/dL      Creatinine 2 15 mg/dL      Glucose 135 mg/dL      Calcium 8 6 mg/dL      Corrected Calcium 9 2 mg/dL      AST 23 U/L      ALT 28 U/L      Alkaline Phosphatase 148 U/L      Total Protein 7 9 g/dL      Albumin 3 3 g/dL      Total Bilirubin 0 33 mg/dL      eGFR 33 ml/min/1 73sq m     Narrative:       [facility] guidelines for Chronic Kidney Disease (CKD):     Stage 1 with normal or high GFR (GFR > 90 mL/min/1 73 square meters)    Stage 2 Mild CKD (GFR = 60-89 mL/min/1 73 square meters)    Stage 3A Moderate CKD (GFR = 45-59 mL/min/1 73 square meters)    Stage 3B Moderate CKD (GFR = 30-44 mL/min/1 73 square meters)    Stage 4 Severe CKD (GFR = 15-29 mL/min/1 73 square meters)    Stage 5 End Stage CKD (GFR <15 mL/min/1 73 square meters)  Note: GFR calculation is accurate only with a steady state creatinine    Troponin I [[id number]]  (Abnormal) Collected:  09/20/20 2357    Lab Status:  Final result Specimen:  Blood from Arm, Right Updated:  09/21/20 0022     Troponin I 0 08 ng/mL     Protime-INR [[id number]]  (Abnormal) Collected:  09/20/20 2357    Lab Status:  Final result Specimen:  Blood from Arm, Right Updated:  09/21/20 0021     Protime 22 5 seconds INR 1 97    APTT [[id number]]  (Normal) Collected:  09/20/20 2357    Lab Status:  Final result Specimen:  Blood from Arm, Right Updated:  09/21/20 0021     PTT 34 seconds     Magnesium [[id number]]  (Normal) Collected:  09/20/20 2357    Lab Status:  Final result Specimen:  Blood from Arm, Right Updated:  09/21/20 0020     Magnesium 1 8 mg/dL     Phosphorus [[id number]]  (Abnormal) Collected:  09/20/20 2357    Lab Status:  Final result Specimen:  Blood from Arm, Right Updated:  09/21/20 0020     Phosphorus 1 7 mg/dL     Fingerstick Glucose (POCT) [[id number]]  (Normal) Collected:  09/20/20 2356    Lab Status:  Final result Updated:  09/21/20 0014     POC Glucose 127 mg/dl     POCT Blood Gas (CG8+) [[id number]]  (Abnormal) Collected:  09/21/20 0006    Lab Status:  Final result Specimen:  Venous Updated:  09/21/20 0009     ph, Cl ISTAT 7 560     pCO2, Cl i-STAT 34 1 mm HG      pO2, Cl i-STAT 36 0 mm HG      BE, i-STAT 8 mmol/L      HCO3, Cl i-STAT 30 5 mmol/L      CO2, i-STAT 32 mmol/L      O2 Sat, i-STAT 77 %      SODIUM, I-STAT 139 mmol/l      Potassium, i-STAT <2 0 mmol/L      Calcium, Ionized i-STAT 1 05 mmol/L      Hct, i-STAT 41 %      Hgb, i-STAT 13 9 g/dl      Glucose, i-STAT 167 mg/dl      Specimen Type VENOUS    CBC and differential [[id number]]  (Abnormal) Collected:  09/20/20 2357    Lab Status:  Final result Specimen:  Blood from Arm, Right Updated:  09/21/20 0005     WBC 15 34 Thousand/uL      RBC 5 06 Million/uL      Hemoglobin 12 8 g/dL      Hematocrit 40 9 %      MCV 81 fL      MCH 25 3 pg      MCHC 31 3 g/dL      RDW 16 4 %      MPV 9 6 fL      Platelets 600 Thousands/uL      nRBC 0 /100 WBCs      Neutrophils Relative 69 %      Immat GRANS % 1 %      Lymphocytes Relative 17 %      Monocytes Relative 9 %      Eosinophils Relative 3 %      Basophils Relative 1 %      Neutrophils Absolute 10 70 Thousands/µL      Immature Grans Absolute 0 08 Thousand/uL      Lymphocytes Absolute 2 60 Thousands/µL Monocytes Absolute 1 43 Thousand/µL      Eosinophils Absolute 0 44 Thousand/µL      Basophils Absolute 0 09 Thousands/µL                  XR chest 1 view portable    (Results Pending)              Procedures  CriticalCare Time  Performed by: Marlon Donato MD  Authorized by: Marlon Donato MD     Critical care provider statement:     Critical care time (minutes):  75    Critical care time was exclusive of:  Separately billable procedures and treating other patients and teaching time    Critical care was necessary to treat or prevent imminent or life-threatening deterioration of the following conditions:  Cardiac failure (Over 20 episodes of V-tach requiring multiple electrolyte replacements, discussions with Cardiology, Critical Care, electrophysiology, multiple anti arrhythmic drips as well as aggressive management monitoring )    Critical care was time spent personally by me on the following activities:  Blood draw for specimens, obtaining history from patient or surrogate, development of treatment plan with patient or surrogate, evaluation of patient's response to treatment, examination of patient, re-evaluation of patient's condition, ordering and review of laboratory studies and ordering and performing treatments and interventions             ED Course  ED Course as of Sep 21 0317   Mon Sep 21, 2020   0025 Malcolm HINOJOSA Fear(!): 1 05   0140 Dr Mraline Sheppard      0189 Discussed once again with Yesica Garcia critical care team, attending physician, will treat with another 40 mEq dose of IV potassium given over 2 hours  Of note, at time of transfer, 3:14 a m , patient pleasant, well-appearing, no longer with runs of V-tach  SBIRT 20yo+      Most Recent Value   SBIRT (24 yo +)   In order to provide better care to our patients, we are screening all of our patients for alcohol and drug use  Would it be okay to ask you these screening questions?   Unable to answer at this time Filed at: 09/21/2020 0000                  MDM    Disposition  Final diagnoses:   [facility])     Time reflects when diagnosis was documented in both MDM as applicable and the Disposition within this note     Time User Action Codes Description Comment    9/21/2020  1:53 AM Reji Wheeler, [address] [I47 2] [facility])       ED Disposition     ED Disposition Condition Date/Time Comment    Transfer to Another [facility] Sep 21, 2020  1:58 AM Steve Dawson should be transferred out to [facility]          MD Documentation      Most Recent Value   Patient Condition  The patient has been stabilized such that within reasonable medical probability, no material deterioration of the patient condition or the condition of the unborn child(bry) is likely to result from the transfer   Reason for Transfer  Level of Care needed not available at this facility   Benefits of Transfer  Specialized equipment and/or services available at the receiving facility (Include comment)________________________   Risks of Transfer  Potential for delay in receiving treatment, Potential deterioration of medical condition, Loss of IV, Increased discomfort during transfer   Accepting Physician  [address] Name, Juliana Spaulding   Sending MD Reji Wheeler   Provider Certification  General risk, such as traffic hazards, adverse weather conditions, rough terrain or turbulence, possible failure of equipment (including vehicle or aircraft), or consequences of actions of persons outside the control of the transport personnel, Unanticipated needs of medical equipment and personnel during transport, Risk of worsening condition, The possibility of a transport vehicle being unavailable, Consent was not obtained as patient is committed to psychiatric facility and transfer is mandated      RN Documentation      [address] Name, [address]   SLB      Follow-up Information    None         Patient's Medications   Discharge Prescriptions    No medications on file     No discharge procedures on file      PDMP Review     None          ED Provider  Electronically Signed by           Marlon Donato MD  09/21/20 3330

## 2020-09-22 PROBLEM — R77.8 ELEVATED TROPONIN: Status: ACTIVE | Noted: 2020-09-22

## 2020-09-22 PROBLEM — I50.9 CHF (CONGESTIVE HEART FAILURE) (HCC): Status: ACTIVE | Noted: 2020-09-22

## 2020-09-22 PROBLEM — I25.10 CAD (CORONARY ARTERY DISEASE): Status: ACTIVE | Noted: 2020-09-22

## 2020-09-22 PROBLEM — N18.9 CKD (CHRONIC KIDNEY DISEASE): Status: ACTIVE | Noted: 2020-09-22

## 2020-09-22 PROBLEM — G47.33 OBSTRUCTIVE SLEEP APNEA: Status: ACTIVE | Noted: 2020-09-22

## 2020-09-22 LAB
ANION GAP SERPL CALCULATED.3IONS-SCNC: 2 MMOL/L (ref 4–13)
ANION GAP SERPL CALCULATED.3IONS-SCNC: 2 MMOL/L (ref 4–13)
ANION GAP SERPL CALCULATED.3IONS-SCNC: 4 MMOL/L (ref 4–13)
BUN SERPL-MCNC: 18 MG/DL (ref 5–25)
BUN SERPL-MCNC: 18 MG/DL (ref 5–25)
BUN SERPL-MCNC: 20 MG/DL (ref 5–25)
CALCIUM SERPL-MCNC: 8.3 MG/DL (ref 8.3–10.1)
CALCIUM SERPL-MCNC: 8.6 MG/DL (ref 8.3–10.1)
CALCIUM SERPL-MCNC: 8.7 MG/DL (ref 8.3–10.1)
CHLORIDE SERPL-SCNC: 107 MMOL/L (ref 100–108)
CHLORIDE SERPL-SCNC: 108 MMOL/L (ref 100–108)
CHLORIDE SERPL-SCNC: 108 MMOL/L (ref 100–108)
CO2 SERPL-SCNC: 29 MMOL/L (ref 21–32)
CO2 SERPL-SCNC: 30 MMOL/L (ref 21–32)
CO2 SERPL-SCNC: 31 MMOL/L (ref 21–32)
CREAT SERPL-MCNC: 1.56 MG/DL (ref 0.6–1.3)
CREAT SERPL-MCNC: 1.65 MG/DL (ref 0.6–1.3)
CREAT SERPL-MCNC: 1.78 MG/DL (ref 0.6–1.3)
ERYTHROCYTE [DISTWIDTH] IN BLOOD BY AUTOMATED COUNT: 16.7 % (ref 11.6–15.1)
GFR SERPL CREATININE-BSD FRML MDRD: 41 ML/MIN/1.73SQ M
GFR SERPL CREATININE-BSD FRML MDRD: 45 ML/MIN/1.73SQ M
GFR SERPL CREATININE-BSD FRML MDRD: 48 ML/MIN/1.73SQ M
GLUCOSE SERPL-MCNC: 108 MG/DL (ref 65–140)
GLUCOSE SERPL-MCNC: 121 MG/DL (ref 65–140)
GLUCOSE SERPL-MCNC: 127 MG/DL (ref 65–140)
GLUCOSE SERPL-MCNC: 144 MG/DL (ref 65–140)
GLUCOSE SERPL-MCNC: 68 MG/DL (ref 65–140)
GLUCOSE SERPL-MCNC: 73 MG/DL (ref 65–140)
GLUCOSE SERPL-MCNC: 91 MG/DL (ref 65–140)
GLUCOSE SERPL-MCNC: 91 MG/DL (ref 65–140)
GLUCOSE SERPL-MCNC: 99 MG/DL (ref 65–140)
HCT VFR BLD AUTO: 38.8 % (ref 36.5–49.3)
HGB BLD-MCNC: 12.1 G/DL (ref 12–17)
MCH RBC QN AUTO: 25.7 PG (ref 26.8–34.3)
MCHC RBC AUTO-ENTMCNC: 31.2 G/DL (ref 31.4–37.4)
MCV RBC AUTO: 82 FL (ref 82–98)
PLATELET # BLD AUTO: 238 THOUSANDS/UL (ref 149–390)
PMV BLD AUTO: 9.7 FL (ref 8.9–12.7)
POTASSIUM SERPL-SCNC: 3.8 MMOL/L (ref 3.5–5.3)
POTASSIUM SERPL-SCNC: 3.9 MMOL/L (ref 3.5–5.3)
POTASSIUM SERPL-SCNC: 4.4 MMOL/L (ref 3.5–5.3)
RBC # BLD AUTO: 4.71 MILLION/UL (ref 3.88–5.62)
SODIUM SERPL-SCNC: 139 MMOL/L (ref 136–145)
SODIUM SERPL-SCNC: 141 MMOL/L (ref 136–145)
SODIUM SERPL-SCNC: 141 MMOL/L (ref 136–145)
WBC # BLD AUTO: 9.04 THOUSAND/UL (ref 4.31–10.16)

## 2020-09-22 PROCEDURE — 85027 COMPLETE CBC AUTOMATED: CPT | Performed by: INTERNAL MEDICINE

## 2020-09-22 PROCEDURE — 99232 SBSQ HOSP IP/OBS MODERATE 35: CPT | Performed by: INTERNAL MEDICINE

## 2020-09-22 PROCEDURE — 82948 REAGENT STRIP/BLOOD GLUCOSE: CPT

## 2020-09-22 PROCEDURE — 80048 BASIC METABOLIC PNL TOTAL CA: CPT | Performed by: EMERGENCY MEDICINE

## 2020-09-22 PROCEDURE — 80048 BASIC METABOLIC PNL TOTAL CA: CPT | Performed by: INTERNAL MEDICINE

## 2020-09-22 PROCEDURE — NC001 PR NO CHARGE: Performed by: INTERNAL MEDICINE

## 2020-09-22 RX ORDER — ISOSORBIDE MONONITRATE 30 MG/1
30 TABLET, EXTENDED RELEASE ORAL DAILY
Status: DISCONTINUED | OUTPATIENT
Start: 2020-09-23 | End: 2020-09-23

## 2020-09-22 RX ORDER — LIDOCAINE 50 MG/G
2 PATCH TOPICAL DAILY
Status: DISCONTINUED | OUTPATIENT
Start: 2020-09-22 | End: 2020-09-26 | Stop reason: HOSPADM

## 2020-09-22 RX ORDER — OXYCODONE HYDROCHLORIDE 5 MG/1
2.5 TABLET ORAL EVERY 4 HOURS PRN
Status: DISCONTINUED | OUTPATIENT
Start: 2020-09-22 | End: 2020-09-26 | Stop reason: HOSPADM

## 2020-09-22 RX ORDER — OXYCODONE HYDROCHLORIDE 5 MG/1
5 TABLET ORAL EVERY 4 HOURS PRN
Status: DISCONTINUED | OUTPATIENT
Start: 2020-09-22 | End: 2020-09-26 | Stop reason: HOSPADM

## 2020-09-22 RX ORDER — POTASSIUM CHLORIDE 20 MEQ/1
20 TABLET, EXTENDED RELEASE ORAL 2 TIMES DAILY
Status: DISCONTINUED | OUTPATIENT
Start: 2020-09-22 | End: 2020-09-25

## 2020-09-22 RX ORDER — LORAZEPAM 0.5 MG/1
0.5 TABLET ORAL
Status: DISCONTINUED | OUTPATIENT
Start: 2020-09-22 | End: 2020-09-26 | Stop reason: HOSPADM

## 2020-09-22 RX ORDER — AMIODARONE HYDROCHLORIDE 200 MG/1
200 TABLET ORAL 2 TIMES DAILY WITH MEALS
Status: DISCONTINUED | OUTPATIENT
Start: 2020-09-22 | End: 2020-09-26 | Stop reason: HOSPADM

## 2020-09-22 RX ORDER — POTASSIUM CHLORIDE 20 MEQ/1
20 TABLET, EXTENDED RELEASE ORAL ONCE
Status: COMPLETED | OUTPATIENT
Start: 2020-09-22 | End: 2020-09-22

## 2020-09-22 RX ADMIN — MELATONIN 6 MG: at 21:20

## 2020-09-22 RX ADMIN — INSULIN LISPRO 20 UNITS: 100 INJECTION, SOLUTION INTRAVENOUS; SUBCUTANEOUS at 08:11

## 2020-09-22 RX ADMIN — AMPICILLIN SODIUM 2000 MG: 2 INJECTION, POWDER, FOR SOLUTION INTRAMUSCULAR; INTRAVENOUS at 19:36

## 2020-09-22 RX ADMIN — FENTANYL CITRATE 25 MCG: 50 INJECTION INTRAMUSCULAR; INTRAVENOUS at 08:07

## 2020-09-22 RX ADMIN — Medication 12.5 MG: at 21:20

## 2020-09-22 RX ADMIN — OXYCODONE HYDROCHLORIDE 5 MG: 5 TABLET ORAL at 18:09

## 2020-09-22 RX ADMIN — INSULIN LISPRO 20 UNITS: 100 INJECTION, SOLUTION INTRAVENOUS; SUBCUTANEOUS at 13:59

## 2020-09-22 RX ADMIN — ACETAMINOPHEN 650 MG: 325 TABLET, FILM COATED ORAL at 19:32

## 2020-09-22 RX ADMIN — POTASSIUM CHLORIDE 20 MEQ: 1500 TABLET, EXTENDED RELEASE ORAL at 06:21

## 2020-09-22 RX ADMIN — POLYETHYLENE GLYCOL 3350 17 G: 17 POWDER, FOR SOLUTION ORAL at 08:10

## 2020-09-22 RX ADMIN — PANTOPRAZOLE SODIUM 40 MG: 40 TABLET, DELAYED RELEASE ORAL at 05:09

## 2020-09-22 RX ADMIN — Medication 12.5 MG: at 10:46

## 2020-09-22 RX ADMIN — INSULIN GLARGINE 30 UNITS: 100 INJECTION, SOLUTION SUBCUTANEOUS at 08:09

## 2020-09-22 RX ADMIN — ASPIRIN 81 MG CHEWABLE TABLET 81 MG: 81 TABLET CHEWABLE at 08:09

## 2020-09-22 RX ADMIN — FENTANYL CITRATE 25 MCG: 50 INJECTION INTRAMUSCULAR; INTRAVENOUS at 02:33

## 2020-09-22 RX ADMIN — INSULIN GLARGINE 30 UNITS: 100 INJECTION, SOLUTION SUBCUTANEOUS at 22:35

## 2020-09-22 RX ADMIN — AMPICILLIN SODIUM 2000 MG: 2 INJECTION, POWDER, FOR SOLUTION INTRAMUSCULAR; INTRAVENOUS at 14:02

## 2020-09-22 RX ADMIN — FLUTICASONE PROPIONATE 2 SPRAY: 50 SPRAY, METERED NASAL at 08:11

## 2020-09-22 RX ADMIN — LIDOCAINE HYDROCHLORIDE 10 ML: 20 SOLUTION ORAL; TOPICAL at 01:47

## 2020-09-22 RX ADMIN — AMILORIDE HYDROCLORIDE 5 MG: 5 TABLET ORAL at 21:22

## 2020-09-22 RX ADMIN — AMILORIDE HYDROCLORIDE 5 MG: 5 TABLET ORAL at 08:12

## 2020-09-22 RX ADMIN — LIDOCAINE 5% 2 PATCH: 700 PATCH TOPICAL at 10:46

## 2020-09-22 RX ADMIN — GABAPENTIN 600 MG: 300 CAPSULE ORAL at 08:10

## 2020-09-22 RX ADMIN — INSULIN LISPRO 20 UNITS: 100 INJECTION, SOLUTION INTRAVENOUS; SUBCUTANEOUS at 16:43

## 2020-09-22 RX ADMIN — OXYCODONE HYDROCHLORIDE 5 MG: 5 TABLET ORAL at 23:51

## 2020-09-22 RX ADMIN — OXYCODONE HYDROCHLORIDE 5 MG: 5 TABLET ORAL at 14:00

## 2020-09-22 RX ADMIN — AMIODARONE HYDROCHLORIDE 1 MG/MIN: 50 INJECTION, SOLUTION INTRAVENOUS at 09:12

## 2020-09-22 RX ADMIN — ISOSORBIDE MONONITRATE 60 MG: 60 TABLET, EXTENDED RELEASE ORAL at 08:10

## 2020-09-22 RX ADMIN — LORAZEPAM 0.5 MG: 0.5 TABLET ORAL at 21:20

## 2020-09-22 RX ADMIN — GABAPENTIN 600 MG: 300 CAPSULE ORAL at 16:30

## 2020-09-22 RX ADMIN — AMIODARONE HYDROCHLORIDE 200 MG: 200 TABLET ORAL at 16:30

## 2020-09-22 RX ADMIN — LIDOCAINE HYDROCHLORIDE 10 ML: 20 SOLUTION ORAL; TOPICAL at 22:40

## 2020-09-22 RX ADMIN — POTASSIUM CHLORIDE 20 MEQ: 1500 TABLET, EXTENDED RELEASE ORAL at 10:46

## 2020-09-22 RX ADMIN — OMEGA-3 FATTY ACIDS CAP 1000 MG 1000 MG: 1000 CAP at 08:10

## 2020-09-22 RX ADMIN — RIVAROXABAN 20 MG: 20 TABLET, FILM COATED ORAL at 16:30

## 2020-09-22 RX ADMIN — AMPICILLIN SODIUM 2000 MG: 2 INJECTION, POWDER, FOR SOLUTION INTRAMUSCULAR; INTRAVENOUS at 05:09

## 2020-09-22 RX ADMIN — AMIODARONE HYDROCHLORIDE 200 MG: 200 TABLET ORAL at 10:46

## 2020-09-22 RX ADMIN — POTASSIUM CHLORIDE 20 MEQ: 1500 TABLET, EXTENDED RELEASE ORAL at 17:50

## 2020-09-22 RX ADMIN — OXYCODONE HYDROCHLORIDE 5 MG: 5 TABLET ORAL at 05:12

## 2020-09-22 RX ADMIN — GABAPENTIN 600 MG: 300 CAPSULE ORAL at 21:20

## 2020-09-22 RX ADMIN — ATORVASTATIN CALCIUM 80 MG: 80 TABLET, FILM COATED ORAL at 16:30

## 2020-09-22 NOTE — PROGRESS NOTES
NEPHROLOGY PROGRESS NOTE   Jey Aldana 62 y o  male MRN: [id number]  Unit/Bed#: MICU 02 Encounter: [id number]  Reason for Consult:  Hypokalemia    Assessment/Plan:  1  Recurrent hypokalemia and mill or I would resumed 5 mg b i d , okay to resume loop diuretic therapy  2  VT storm, suspected secondary to significant hypokalemia with a potassium less than 2 0 at presentation  3  Chronic kidney disease, stage III/4, baseline creatinine high 1s to low 2s , remains at baseline  4  Cardiomyopathy with ejection fraction 40%  5  Atrial fibrillation  6  Obstructive sleep apnea  7  Chronic hypotension, intolerant of spironolactone    PLAN:  · Overall renal function remains stable  · Potassium has normalized, continue with Amiloride as well as potassium supplementation  · Okay to resume loop diuretic therapy    SUBJECTIVE:  Awake alert  Offers no new complaints  Denies any chest pain or pressure  Denies any shortness of breath  Denies abdominal pain  Review of Systems    OBJECTIVE:  Current Weight: Weight - Scale: 136 kg (299 lb 13 2 oz)  Vitals:    09/22/20 0500 09/22/20 0551 09/22/20 0700 09/22/20 0800   BP: 107/54  137/73 119/79   BP Location:       Pulse: 74  72 72   Resp: 18  21 20   Temp:       TempSrc:       SpO2: 94%  96% 96%   Weight:  136 kg (299 lb 13 2 oz)     Height:           Intake/Output Summary (Last 24 hours) at 9/22/2020 1016  Last data filed at 9/22/2020 0800  Gross per 24 hour   Intake 3488 05 ml   Output 4025 ml   Net -536 95 ml       Physical Exam  Constitutional:       Appearance: Normal appearance  He is obese  HENT:      Head: Normocephalic and atraumatic  Eyes:      General: No scleral icterus  Cardiovascular:      Rate and Rhythm: Normal rate and regular rhythm  Pulmonary:      Effort: Pulmonary effort is normal       Breath sounds: Normal breath sounds  Abdominal:      General: There is distension  Tenderness: There is no abdominal tenderness     Musculoskeletal:      Right lower leg: No edema  Left lower leg: No edema  Skin:     General: Skin is warm and dry  Neurological:      Mental Status: He is alert and oriented to person, place, and time           Medications:    Current Facility-Administered Medications:     acetaminophen (TYLENOL) tablet 650 mg, 650 mg, Oral, Q4H PRN, Javy Pimentel MD    al mag oxide-diphenhydramine-lidocaine viscous (MAGIC MOUTHWASH) suspension 10 mL, 10 mL, Swish & Swallow, Q4H PRN, Javy Pimentel MD, 10 mL at 09/22/20 0147    AMILoride tablet 5 mg, 5 mg, Oral, BID, Peggy Bratis, DO, 5 mg at 09/22/20 7739    amiodarone tablet 200 mg, 200 mg, Oral, BID With Meals, Jamil Santoro MD    ampicillin (OMNIPEN) 2,000 mg in sodium chloride 0 9 % 100 mL IVPB, 2,000 mg, Intravenous, Q6H, Drew Pimentel MD, Last Rate: 200 mL/hr at 09/22/20 0509, 2,000 mg at 09/22/20 0509    aspirin chewable tablet 81 mg, 81 mg, Oral, Daily, Javy Pimentel MD, 81 mg at 09/22/20 0809    atorvastatin (LIPITOR) tablet 80 mg, 80 mg, Oral, Daily With Claire Davis MD, 80 mg at 09/21/20 1614    ferrous sulfate tablet 325 mg, 325 mg, Oral, Every Other Day, Wen Wheeler MD, 325 mg at 09/21/20 0820    fish oil capsule 1,000 mg, 1,000 mg, Oral, Daily, Drew Pimentel MD, 1,000 mg at 09/22/20 0810    fluticasone (FLONASE) 50 mcg/act nasal spray 2 spray, 2 spray, Each Nare, Daily, Javy Pimentel MD, 2 spray at 09/22/20 0811    gabapentin (NEURONTIN) capsule 600 mg, 600 mg, Oral, TID, Drew Pimentel MD, 600 mg at 09/22/20 0810    insulin glargine (LANTUS) subcutaneous injection 30 Units 0 3 mL, 30 Units, Subcutaneous, Q12H [address], Suzi Pearson MD, 30 Units at 09/22/20 0809    insulin lispro (HumaLOG) 100 units/mL subcutaneous injection 2-12 Units, 2-12 Units, Subcutaneous, TID AC, 2 Units at 09/21/20 1618 **AND** Fingerstick Glucose (POCT), , , TID AC, Drew Pimentel MD    insulin lispro (HumaLOG) 100 units/mL subcutaneous injection 20 Units, 20 Units, Subcutaneous, TID With Meals, Paia Area, MD, 20 Units at 09/22/20 0811    [START ON 9/23/2020] isosorbide mononitrate (IMDUR) 24 hr tablet 30 mg, 30 mg, Oral, Daily, Rula Gr PA-C    lidocaine (LIDODERM) 5 % patch 2 patch, 2 patch, Topical, Daily, Rula Gr PA-C    melatonin tablet 6 mg, 6 mg, Oral, HS, Drew Pimentel MD, 6 mg at 09/21/20 2201    metoprolol tartrate (LOPRESSOR) partial tablet 12 5 mg, 12 5 mg, Oral, Q12H GENET, Mai Gr PA-C    oxyCODONE (ROXICODONE) IR tablet 2 5 mg, 2 5 mg, Oral, Q4H PRN **OR** oxyCODONE (ROXICODONE) IR tablet 5 mg, 5 mg, Oral, Q4H PRN, Gerry Landis PA-C    pantoprazole (PROTONIX) EC tablet 40 mg, 40 mg, Oral, Early Morning, Drew Pimentel MD, 40 mg at 09/22/20 0509    polyethylene glycol (MIRALAX) packet 17 g, 17 g, Oral, Daily, Drew Pimentel MD, 17 g at 09/22/20 0810    potassium chloride (K-DUR,KLOR-CON) CR tablet 20 mEq, 20 mEq, Oral, BID, Gerry Landis PA-C    rivaroxaban (XARELTO) tablet 20 mg, 20 mg, Oral, Daily With Micheal Mandel MD, 20 mg at 09/21/20 1630    senna (SENOKOT) tablet 8 6 mg, 1 tablet, Oral, HS PRN, Aspen Pimentel MD    Laboratory Results:  Results from last 7 days   Lab Units 09/22/20  0508 09/22/20  0227 09/21/20  2050 09/21/20  1402 09/21/20  0427 09/21/20  0006 09/20/20  2357   WBC Thousand/uL 9 04  --   --   --  12 39*  --  15 34*   HEMOGLOBIN g/dL 12 1  --   --   --  11 8*  --  12 8   I STAT HEMOGLOBIN g/dl  --   --   --   --   --  13 9  --    HEMATOCRIT % 38 8  --   --   --  38 6  --  40 9   HEMATOCRIT, ISTAT %  --   --   --   --   --  41  --    PLATELETS Thousands/uL 238  --   --   --  259  --  343   POTASSIUM mmol/L 3 8 3 9 3 5 3 0* 2 7*  --  1 8*   CHLORIDE mmol/L 108 107 103 100 96*  --  94*   CO2 mmol/L 31 30 29 29 32  --  28   CO2, I-STAT mmol/L  --   --   --   --   --  32  --    BUN mg/dL 18 20 20 20 23  --  28*   CREATININE mg/dL 1 56* 1 65* 1 76* 1 54* 1 75*  --  2 15*   CALCIUM mg/dL 8 6 8 3 8 4 8 3 8 2*  --  8 6   MAGNESIUM mg/dL  --   --   --   --  2 6  --  1 8   PHOSPHORUS mg/dL  --   --   --   --  3 4  --  1 7*   GLUCOSE, ISTAT mg/dl  --   --   --   --   --  167*  --

## 2020-09-22 NOTE — PROGRESS NOTES
Transfer note    Code Status: Level 1 - Full Code  POA:    POLST:      Reason for ICU admission:   V-tach storm    Active problems:   Principal Problem:    Ventricular tachycardia ([address] )  Active Problems:    Hypokalemia    CAD (coronary artery disease)    CKD (chronic kidney disease)    CHF (congestive heart failure) ([facility])  Resolved Problems:    * No resolved hospital problems  *      Consultants:   Nephrology  Cardiology  Heart failure    History of Present Illness:   "62 y o  male with CHF, coronary artery disease, AFib on Xarelto, CKD, hypertension, hyperlipidemia, type 2 diabetes, JONNA, and squamous cell carcinoma of the tongue status post partial resection who presents to the ICU as a transfer from [address] [place] [address] after patient had innumerable episodes of V-tach at home and in the emergency department  While at [facility], patient was found to be hypokalemic  He was bolused with amiodarone as well as lidocaine and is currently on an amiodarone and lidocaine infusion  He received a total of 40 mEq of IV potassium as well as 40 mEq of p o  Potassium prior to arrival   Patient currently complains of chest and back pain as well as shortness of breath      Of note patient had recent admission to the hospital at [facility] on   He initially presented to the hospital at that time with complaints of neck pain and difficulty swallowing  On ED arrival at that time, the patient was noted to be febrile and tachycardic  Labs demonstrated leukocytosis and hyperglycemia as well as FORD and lactic acidosis  He was started on broad-spectrum antibiotics  One of 2 blood cultures were positive for strep  A PICC line was placed in the patient has been receiving IV ampicillin set to  on     Per Infectious Disease recommendations at that time, repeat blood cultures to be ordered following completion of antibiotics "     Summary of clinical course:   Patient initially presented to [address] [place] [address] [facility] after being shocked multiple times by his ICD  Patient was found to be in V-tach storm likely secondary to hypokalemia  Patient was transferred to [facility] for further management and for EP evaluation  When patient arrived, he was on an amiodarone and lidocaine drip  He was still persistently hypokalemic which was repleted appropriately  Patient was taken off the lidocaine drip, continued on the amiodarone drip, with no further episodes of V-tach or VFib  Nephrology and heart failure were consulted whom offered recommendations on changing the patient's outpatient diuretic regimen  Further management of patients diuretics per nephro, hf, primary  Patient will need his picc line removed prior to discharge  Recent or scheduled procedures:   NA    Outstanding/pending diagnostics:   NA    Cultures:   Repeat blood cultures ordered 9/23  Patient had 1/2 positive blood cultures at [facility], likely contaminant  Patient is set to complete course of ampicillin today with recommendations from [facility]'s Infectious Disease to check repeat blood cultures       Mobilization Plan:   Up and out of bed as tolerated    Nutrition Plan:   Diabetic diet    Invasive Devices Review  Patient's PICC line can be removed following completion of antibiotics today  Invasive Devices     Peripherally Inserted Central Catheter Line            PICC Line Right -- days          Peripheral Intravenous Line            Peripheral IV Right Antecubital -- days    Peripheral IV 09/21/20 Left Forearm 1 day                VTE Pharmacologic Prophylaxis: Apixaban (Eliquis)  VTE Mechanical Prophylaxis: sequential compression device    Discharge Plan:   Patient should be ready for discharge to home after cardiac clearance and resolution of hypokalemia      Initial /Plan:  Awaiting evaluation    Home medications that are not reordered and reason why:   Metolazone - hypokalemia  Torsemide - hypokalemia      Spoke with [facility] Dr Maurice Charles regarding transfer  Please call with any questions or concerns  Portions of the record may have been created with voice recognition software  Occasional wrong word or "sound a like" substitutions may have occurred due to the inherent limitations of voice recognition software  Read the chart carefully and recognize, using context, where substitutions have occurred

## 2020-09-22 NOTE — UTILIZATION REVIEW
Initial Clinical Review    Admission: Date/Time/Statement:   Admission Orders (From admission, onward)     Ordered        09/21/20 0404  Inpatient Admission  Once                   Orders Placed This Encounter   Procedures    Inpatient Admission     Standing Status:   Standing     Number of Occurrences:   1     Order Specific Question:   Admitting Physician     Answer:   Shahid Groves [[id number]]     Order Specific Question:   Level of Care     Answer:   Level 1 Stepdown [13]     Order Specific Question:   Estimated length of stay     Answer:   More than 2 Midnights     Order Specific Question:   Certification     Answer:   I certify that inpatient services are medically necessary for this patient for a duration of greater than two midnights  See H&P and MD Progress Notes for additional information about the patient's course of treatment  9/20/2020 - 9/21/2020 (3 hours)  [facility]  V-Tach    [place] [facility]   Prior to arrival : iv fentanyl, iv mag so4, iv amiodarone gtt, iv na phos, iv lidocaine, iv kcl    Assessment/Plan:    62year old male received from  [facility] ed, for evaluation and treatment of ventricular tachycardia, ICD shocks, elevated troponin, potassium 1 8  He received 80 meq potassium prior to arrival   He is currently being treated for  infection from an outside hospital with iv antibiotics until 9-22-20  picc line in place  Admit to critical care for V tach, elevated troponin, severe hypokalemia    Plan includes: continue amio and lidocaine infusion, icd interrogation, cardiology consult, electrophysiology, telemetry, replete potassium,     Consult cardiology  Impression with assessment and plan  Recurrent ventricular fibrillation, polymorphic ventricular tachycardia  Severe hypokalemia with potassium 1 8  Current episode precipitated after use of metolazone; prior history of same  My recommendation for the patient:  - keep potassium between 4 and 4 5  Keep magnesium between 2 and 2 5     - consult heart failure -Need to have a regimen that does not use metolazone - this is 2nd episode with recurrent VF in the presents of hypokalemia     - consult nephrology - To find out a regimen of diuretics, potassium supplementation which prevent such profound hypokalemia     - device check-daily for the next few days, to reassess battery longevity  There has been acute drained due to the recurrent shocks for over 5 years of battery    Consult nephrology    1  Recurrent hypokalemia would resume amiloride 10 mg daily, current diuretics are on hold with aggressive potassium replacement  2  VT storm, suspected secondary to significant hypokalemia with a potassium less than 2 0  3  Chronic kidney disease, stage III/4, baseline creatinine high 1s to low 2s  4  Cardiomyopathy with ejection fraction 40%  5  Atrial fibrillation    · Patient has been intolerant of spironolactone in the past because of labile blood pressures  Has tolerated Amiloride, would resume at 5 mg b i d   · Continue with potassium replacement  · Loop diuretic as well as metolazone currently on hold  · Renal function appears fairly stable, baseline fluctuant given cardiomyopathy between high 1s low 2s  Consult  Heart failure   62years-old morbidly obese T2DM gentleman with known chronic systolic heart failure resulting from ischemic heart disease  Urgently admitted with severe electrolyte imbalance and VT/VF storm resulting in multiple appropriate AICD shocks  Needs urgent and agressive correction of hypokalemia and escalation of HF-GDMT with addition of MCRA  9-22-20    No acute events overnight  Additional 60 meq of potassium given  Now potassium 3 5  Discontinue amiodarone gtt  Plan to check icd daily  Questionable bacteremia with 1 of 2 blood cultures positive at outside hospital   Discontinue iv ampicillin, and repeat blood cultures  PT /OT evaluations ordered  Continue bipap         ED Triage Vitals   09/21/20 0500 09/21/20 0500 09/21/20 0800 09/21/20 0500 09/21/20 0500   97 9 °F (36 6 °C) 68 17 98/57 98 %      Oral Monitor         Worst Possible Pain          09/22/20 136 kg (299 lb 13 2 oz)     Additional Vital Signs:       Date/Time   Temp   Pulse   Resp   BP   MAP (mmHg)   SpO2   O2 Device     09/22/20 13:17:05   98 3 °F (36 8 °C)   72   18   125/75   92   95 %        09/22/20 0900      78   24Abnormal     117/75   89   96 %        09/22/20 0800      72   20   119/79   90   96 %        09/22/20 0700      72   21   137/73   86   96 %        09/22/20 0500      74   18   107/54   81   94 %        09/22/20 0300      70   20   103/55   70   95 %   None (Room air)     09/22/20 0100      72   12   106/59   86   94 %        09/22/20 0000   97 8 °F (36 6 °C)   68   17   111/60   88   91 %        09/21/20 2300      68   24Abnormal     105/69   80   100 %   None (Room air)     09/21/20 2230                  100 %        09/21/20 2000      68   23Abnormal     117/69   84   98 %   None (Room air)     09/21/20 1900      70   39Abnormal        86   95 %        09/21/20 1800      66   17   135/76   93   99 %        09/21/20 1700      68   16   129/76   92   100 %        09/21/20 1600   97 7 °F (36 5 °C)   74   18   126/75   92   99 %        09/21/20 1500      64      107/68   84   100 %        09/21/20 1400      62      129/77   104   100 %        09/21/20 1300      62   18   109/62   76   100 %        09/21/20 1200   97 9 °F (36 6 °C)   60   17   105/65   80   100 %        09/21/20 1100      60   15   103/66   79   100 %        09/21/20 1000      60   15   104/62   75   100 %        09/21/20 0900      62   15   96/55   71   100 %        09/21/20 0800   97 7 °F (36 5 °C)   62   17   88/47Abnormal     57   99 %   Nasal cannula     09/21/20 0600      62      90/52   62   98 %                    Pertinent Labs/Diagnostic Test Results: Results from last 7 days   Lab Units 09/21/20  1504   SARS-COV-2  Negative     Results from last 7 days   Lab Units 09/22/20  0508 09/21/20  0427 09/21/20  0006 09/20/20  2357   WBC Thousand/uL 9 04 12 39*  --  15 34*   HEMOGLOBIN g/dL 12 1 11 8*  --  12 8   I STAT HEMOGLOBIN g/dl  --   --  13 9  --    HEMATOCRIT % 38 8 38 6  --  40 9   HEMATOCRIT, ISTAT %  --   --  41  --    PLATELETS Thousands/uL 238 259  --  343   NEUTROS ABS Thousands/µL  --  11 37*  --  10 70*         Results from last 7 days   Lab Units 09/22/20  0508 09/22/20  0227 09/21/20 2050 09/21/20  1402 09/21/20  0427 09/21/20  0006 09/20/20  2357   SODIUM mmol/L 141 141 138 136 135*  --  137   POTASSIUM mmol/L 3 8 3 9 3 5 3 0* 2 7*  --  1 8*   CHLORIDE mmol/L 108 107 103 100 96*  --  94*   CO2 mmol/L 31 30 29 29 32  --  28   CO2, I-STAT mmol/L  --   --   --   --   --  32  --    ANION GAP mmol/L 2* 4 6 7 7  --  15*   BUN mg/dL 18 20 20 20 23  --  28*   CREATININE mg/dL 1 56* 1 65* 1 76* 1 54* 1 75*  --  2 15*   EGFR ml/min/1 73sq m 48 45 41 49 42  --  33   CALCIUM mg/dL 8 6 8 3 8 4 8 3 8 2*  --  8 6   CALCIUM, IONIZED, ISTAT mmol/L  --   --   --   --   --  1 05*  --    MAGNESIUM mg/dL  --   --   --   --  2 6  --  1 8   PHOSPHORUS mg/dL  --   --   --   --  3 4  --  1 7*     Results from last 7 days   Lab Units 09/21/20  1402 09/21/20  0427 09/20/20  2357   AST U/L 30 28 23   ALT U/L 26 29 28   ALK PHOS U/L 135* 142* 148*   TOTAL PROTEIN g/dL 6 8 7 2 7 9   ALBUMIN g/dL 2 7* 3 0* 3 3*   TOTAL BILIRUBIN mg/dL 0 40 0 35 0 33     Results from last 7 days   Lab Units 09/22/20  1234 09/22/20  1144 09/22/20  0800 09/21/20  2032 09/21/20  1758 09/21/20  1617 09/21/20  1052 09/21/20  0629 09/20/20  2356   POC GLUCOSE mg/dl 91 144* 73 198* 272* 162* 203* 217* 127     Results from last 7 days   Lab Units 09/22/20  0508 09/22/20  0227 09/21/20  2050 09/21/20  1402 09/21/20  0427 09/20/20  2357   GLUCOSE RANDOM mg/dL 108 127 194* 173* 228* 135     Results from last 7 days   Lab Units 09/21/20  0006   PH, ABAD I-STAT  7 560*   PCO2, ABAD ISTAT mm HG 34 1*   PO2, ABAD ISTAT mm HG 36 0   HCO3, ABAD ISTAT mmol/L 30 5*   I STAT BASE EXC mmol/L 8*   I STAT O2 SAT % 77         Results from last 7 days   Lab Units 09/21/20  1618 09/21/20  1359 09/21/20  0941 09/21/20  0427 09/20/20  2357   TROPONIN I ng/mL 2 07* 2 49* 2 38* 2 05* 0 08*         Results from last 7 days   Lab Units 09/20/20  2357   PROTIME seconds 22 5*   INR  1 97*   PTT seconds 34     Results from last 7 days   Lab Units 09/21/20  0427   TSH 3RD GENERATON uIU/mL 5 080*       Results from last 7 days   Lab Units 09/21/20  0748   NT-PRO BNP pg/mL 614*         Results from last 7 days   Lab Units 09/21/20  0117   CLARITY UA  Clear   COLOR UA  Yellow   SPEC GRAV UA  1 010   PH UA  7 0   GLUCOSE UA mg/dl Negative   KETONES UA mg/dl Negative   BLOOD UA  Negative   PROTEIN UA mg/dl Negative   NITRITE UA  Negative   BILIRUBIN UA  Negative   UROBILINOGEN UA E U /dl 0 2   LEUKOCYTES UA  Negative       No past medical history on file    Present on Admission:   CAD (coronary artery disease)   CKD (chronic kidney disease)   CHF (congestive heart failure) (HCC)   Obstructive sleep apnea      Admitting Diagnosis: V-tach ([facility] [address] ) [I47 2]     Age/Sex: 62 y o  male     Admission Orders:        AMILoride, 5 mg, Oral, BID  amiodarone, 200 mg, Oral, BID With Meals  ampicillin, 2,000 mg, Intravenous, Q6H  aspirin, 81 mg, Oral, Daily  atorvastatin, 80 mg, Oral, Daily With Dinner  ferrous sulfate, 325 mg, Oral, Every Other Day  fish oil, 1,000 mg, Oral, Daily  fluticasone, 2 spray, Each Nare, Daily  gabapentin, 600 mg, Oral, TID  insulin glargine, 30 Units, Subcutaneous, Q12H GENET  insulin lispro, 2-12 Units, Subcutaneous, TID AC  insulin lispro, 20 Units, Subcutaneous, TID With Meals  [START ON 9/23/2020] isosorbide mononitrate, 30 mg, Oral, Daily  lidocaine, 2 patch, Topical, Daily  melatonin, 6 mg, Oral, HS  metoprolol tartrate, 12 5 mg, Oral, Q12H [address]  pantoprazole, 40 mg, Oral, Early Morning  polyethylene glycol, 17 g, Oral, Daily  potassium chloride, 20 mEq, Oral, BID  rivaroxaban, 20 mg, Oral, Daily With Dinner      Continuous IV Infusions:    amiodarone (CORDARONE) 900 mg in dextrose 5 % 500 mL infusion    Rate: 33 3 mL/hr Dose: 1 mg/min  Freq: Continuous Route: IV  Last Dose: Stopped (09/22/20 1045)  Start: 09/21/20 0615 End: 09/22/20 0945   lidocaine 2000 mg in 250 mL infusion (cardiac premix)- NOT FOR TREATMENT OF PAIN    Rate: 7 5 mL/hr Dose: 1 mg/min  Freq: Continuous Route: IV  Last Dose: Stopped (09/21/20 1609)  Start: 09/21/20 0615 End: 09/21/20 1607   lidocaine 2000 mg in 250 mL infusion (cardiac premix)- NOT FOR TREATMENT OF PAIN    Rate: 7 5 mL/hr Dose: 1 mg/min  Freq: Continuous Route: IV  Last Dose: Stopped (09/21/20 1609)  Start: 09/21/20 0615 End: 09/21/20 1607     fentanyl citrate (PF) 100 MCG/2ML 25 mcg    Dose: 25 mcg  Freq: Every 1 hour PRN Route: IV  PRN Reason: breakthrough pain  Start: 09/21/20 0512 End: 09/22/20 0948  9-21 x7  9-22 x2      PRN Meds:  acetaminophen, 650 mg, Oral, Q4H PRN  al mag oxide-diphenhydramine-lidocaine viscous, 10 mL, Swish & Swallow, Q4H PRN  oxyCODONE, 2 5 mg, Oral, Q4H PRN    Or  oxyCODONE, 5 mg, Oral, Q4H PRN  senna, 1 tablet, Oral, HS PRN        IP CONSULT TO CASE MANAGEMENT  IP CONSULT TO ELECTROPHYSIOLOGY  IP CONSULT TO CARDIOLOGY  IP CONSULT TO NEPHROLOGY    Network Utilization Review Department  [email or web address]  ATTENTION: Please call with any questions or concerns to [phone] and carefully listen to the prompts so that you are directed to the right person  All voicemails are confidential   Deneen Bro all requests for admission clinical reviews, approved or denied determinations and any other requests to dedicated fax number below belonging to the campus where the patient is receiving treatment   List of dedicated fax numbers for the Facilities:  FACILITY NAME UR FAX NUMBER   ADMISSION DENIALS (Administrative/Medical Necessity) [phone]   PARENT CHILD HEALTH (Maternity/NICU/Pediatrics) [phone]     Libertad Grandeandrews [phone]   Kirklin Antonio [phone]   [place]  [phone]   [address] [phone]   [facility]  [phone] [address] [place] [address], [facility]  [address] [place] [address] [phone]

## 2020-09-22 NOTE — PROGRESS NOTES
Heart Failure - Progress Note  Abeab Jin 62 y o  male MRN: [id number]  Unit/Bed#: MICU 02 Encounter: [id number]    Assessment:  Principal Problem:    Ventricular tachycardia (HCC)  Active Problems:    Hypokalemia    CAD (coronary artery disease)    CKD (chronic kidney disease)    CHF (congestive heart failure) (HCC)    Elevated troponin    Obstructive sleep apnea    # VT Storm/VF cardiac arrest 2/2 R-on-T from severe hypokalemia  -etiology of hypokalemia likely diuretic (predemontantly metolazone) therapy; cetuximab also has a predilection to cause hypokalemia  -on amio gtt (lido off) without recurrence of VT/VF  -hypokalemia improving with holding diuretic, supplementing K  # Chronic ischemic HFrEF 45-50%; FC II-III; Stage C  -likely some degree of volume overload present  -TTE [facility] 09/14/20 per report: LVEF 40-45%, LVEDd 6 7 cm, DD1, normal RV, severe LAE  -OP regimen: torsemide 60mg BID, metolazone 2 5mg MWF (with prn increase to 5mg when he though he was overloaded), K 20mEq BID; not on BB per EMR due to hypotension with Coreg?  -reported dry weight ~280-285lb; current weights with bed scale unreliable  # CAD  -hx of  RCA per EMR  -[facility] 04/2019 [facility] Report: no ischemia, large infarct of the inferior/inferolateral walls, LVEF 34%  -OP regimen:  ASA, Imdur 120mg, atorva 80mg  # PAF on Xarelto; not on AVB  # HTN  # HLD  # JONNA on BPAP  # Obesity  # SCC of tongue on cetuximab/RT  # FORD on CKD; resolved  -baseline Cr ~1 7  # Strep spp  Bacteremia at [facility] on 9/8 admission  -unclear if contaminant; on IV abx; repeat cx pending        Plan:  1  Continue amiloride for potassium sparing  A consideration for switching amiloride to eplerenone can be made as OP as it has better data in the setting of HFrEF (patient did mention atypical "allergic" response to spironolactone)  2  As K has now normalized, consider restarting home diuretic torsemide 60mg BID this afternoon with close monitoring of potassium   If diuretic is started, then schedule KDur 20 mEq BID to start with and optimize dose based on K levels  3  If BP permits, consider restarting low dose beta blocker (e g  Toprol XL 25mg daily) as patient has multiple indications (CAD, HFrEF, PAF)  His Imdur may need to be further reduced to accomplish this with tenuous BP   4  Will follow with you  Subjective/Objective     Subjective: Patient had a hard time sleeping with BPAP overnight  On several occasions he also felt like it was the prodrome before ICD therapy  His device was interrogated this AM   Otherwise he feels well  He has had some residual atypical pain after multiple ICD discharges that is well controlled with analgesics  Denies dyspnea, orthopnea, PND      Objective:  Vitals: /54   Pulse 74   Temp 97 8 °F (36 6 °C) (Oral)   Resp 18   Ht 6' (1 829 m)   Wt 136 kg (299 lb 13 2 oz)   SpO2 94%   BMI 40 66 kg/m²   Vitals:    09/21/20 0800 09/22/20 0551   Weight: 132 kg (291 lb 14 2 oz) 136 kg (299 lb 13 2 oz)     Orthostatic Blood Pressures      Most Recent Value   Blood Pressure  107/54 filed at 09/22/2020 0500   Patient Position - Orthostatic VS  Lying filed at 09/22/2020 0300            Intake/Output Summary (Last 24 hours) at 9/22/2020 2345  Last data filed at 9/22/2020 0741  Gross per 24 hour   Intake 3477 52 ml   Output 4025 ml   Net -547 48 ml       Physical Exam:   General appearance: alert and in no acute distress  Head: Normocephalic, without obvious abnormality, atraumatic  Neck:unable to appreciate neck veins due to body habitus; supple, symmetrical, trachea midline  Lungs: clear to auscultation bilaterally, Normal air entry, Normal effort, No rales, wheezing  Heart: S1, S2 normal and no S3 or S4, No murmur, No gallop, No rub  Abdomen: soft, non-tender; no masses,  no organomegaly  Extremities: extremities normal, atraumatic, no cyanosis, no edema  Pulses: 2+ and symmetric bilaterally  Skin: Skin color, texture, turgor normal; No rashes or lesions  Neurologic: Grossly normal, Alert and oriented      Medications:    Current Facility-Administered Medications:     acetaminophen (TYLENOL) tablet 650 mg, 650 mg, Oral, Q4H PRN, Nathaniel Pimentel MD    al mag oxide-diphenhydramine-lidocaine viscous (MAGIC MOUTHWASH) suspension 10 mL, 10 mL, Swish & Swallow, Q4H PRN, Nathaniel Pimentel MD, 10 mL at 09/22/20 0147    AMILoride tablet 5 mg, 5 mg, Oral, BID, Peggy Bratis, DO, 5 mg at 09/21/20 2201    amiodarone (CORDARONE) 900 mg in dextrose 5 % 500 mL infusion, 1 mg/min, Intravenous, Continuous, Drew Pimentel MD, Last Rate: 33 3 mL/hr at 09/21/20 1654, 1 mg/min at 09/21/20 1654    ampicillin (OMNIPEN) 2,000 mg in sodium chloride 0 9 % 100 mL IVPB, 2,000 mg, Intravenous, Q6H, Drew Pimentel MD, Last Rate: 200 mL/hr at 09/22/20 0509, 2,000 mg at 09/22/20 0509    aspirin chewable tablet 81 mg, 81 mg, Oral, Daily, Nathaniel Pimentel MD, 81 mg at 09/21/20 0820    atorvastatin (LIPITOR) tablet 80 mg, 80 mg, Oral, Daily With Liza Sadler MD, 80 mg at 09/21/20 1614    fentanyl citrate (PF) 100 MCG/2ML 25 mcg, 25 mcg, Intravenous, Q1H PRN, Nathaniel Pimentel MD, 25 mcg at 09/22/20 9981    ferrous sulfate tablet 325 mg, 325 mg, Oral, Every Other Day, Nathaniel Pimentel MD, 325 mg at 09/21/20 0820    fish oil capsule 1,000 mg, 1,000 mg, Oral, Daily, Drew Pimentel MD, 1,000 mg at 09/21/20 1848    fluticasone (FLONASE) 50 mcg/act nasal spray 2 spray, 2 spray, Each Nare, Daily, Nathaniel Pimentel MD, 2 spray at 09/21/20 7661    gabapentin (NEURONTIN) capsule 600 mg, 600 mg, Oral, TID, Nathaniel Pimentel MD, 600 mg at 09/21/20 2004    insulin glargine (LANTUS) subcutaneous injection 30 Units 0 3 mL, 30 Units, Subcutaneous, Q12H [facility], Serene Hilario MD, 30 Units at 09/21/20 2012    insulin lispro (HumaLOG) 100 units/mL subcutaneous injection 2-12 Units, 2-12 Units, Subcutaneous, TID AC, 2 Units at 09/21/20 1618 **AND** Fingerstick Glucose (POCT), , , TID AC, Sanford Hansen MD    insulin lispro (HumaLOG) 100 units/mL subcutaneous injection 20 Units, 20 Units, Subcutaneous, TID With Meals, Melani Cohen MD    isosorbide mononitrate (IMDUR) 24 hr tablet 60 mg, 60 mg, Oral, Daily, Drew Pimentel MD, 60 mg at 09/21/20 6718    melatonin tablet 6 mg, 6 mg, Oral, HS, Drew Pimentel MD, 6 mg at 09/21/20 2201    oxyCODONE (ROXICODONE) IR tablet 5 mg, 5 mg, Oral, Q6H PRN, Ebony Pimentel MD, 5 mg at 09/22/20 0382    pantoprazole (PROTONIX) EC tablet 40 mg, 40 mg, Oral, Early Morning, Drew Pimentel MD, 40 mg at 09/22/20 0718    polyethylene glycol (MIRALAX) packet 17 g, 17 g, Oral, Daily, Ebony Pimentel MD    rivaroxaban (XARELTO) tablet 20 mg, 20 mg, Oral, Daily With Kimberly Pimentel MD, 20 mg at 09/21/20 1630    senna (SENOKOT) tablet 8 6 mg, 1 tablet, Oral, HS PRN, Ebony Pmientel MD    Lab Results:  Results from last 7 days   Lab Units 09/21/20  1618 09/21/20  1359 09/21/20  0941   TROPONIN I ng/mL 2 07* 2 49* 2 38*     Results from last 7 days   Lab Units 09/22/20  0508 09/21/20  0427 09/21/20  0006 09/20/20  2357   WBC Thousand/uL 9 04 12 39*  --  15 34*   HEMOGLOBIN g/dL 12 1 11 8*  --  12 8   I STAT HEMOGLOBIN g/dl  --   --  13 9  --    HEMATOCRIT % 38 8 38 6  --  40 9   HEMATOCRIT, ISTAT %  --   --  41  --    PLATELETS Thousands/uL 238 259  --  343         Results from last 7 days   Lab Units 09/22/20  0508 09/22/20  0227 09/21/20  2050 09/21/20  1402 09/21/20  0427 09/21/20  0006 09/20/20  2357   POTASSIUM mmol/L 3 8 3 9 3 5 3 0* 2 7*  --  1 8*   CHLORIDE mmol/L 108 107 103 100 96*  --  94*   CO2 mmol/L 31 30 29 29 32  --  28   CO2, I-STAT mmol/L  --   --   --   --   --  32  --    BUN mg/dL 18 20 20 20 23  --  28*   CREATININE mg/dL 1 56* 1 65* 1 76* 1 54* 1 75*  --  2 15*   CALCIUM mg/dL 8 6 8 3 8 4 8 3 8 2*  --  8 6   ALK PHOS U/L  --   --   --  135* 142*  --  148*   ALT U/L  --   --   --  26 29  --  28   AST U/L  --   --   --  30 28  --  23 GLUCOSE, ISTAT mg/dl  --   --   --   --   --  167*  --      Results from last 7 days   Lab Units 09/20/20  2357   INR  1 97*   PTT seconds 34     Results from last 7 days   Lab Units 09/21/20  0427 09/20/20  2357   MAGNESIUM mg/dL 2 6 1 8       Telemetry: Personally reviewed  No events  Imaging: Personally reviewed  EKG: Personally reviewed       Bo Mata MD  Cardiology Fellow

## 2020-09-22 NOTE — PROGRESS NOTES
Electrophysiology Progress Note - Luisa Esparza 62 y o  male MRN: [id number]    Unit/Bed#: MICU 02 Encounter: [id number]      Subjective:   Patient seen and examined  No significant telemetry events overnight  Patient remained in sinus mechanism with no evidence of ventricular ectopy  K has increased to therapeutic levels at 3 8 this morning  No further defibrillating episodes  Objective:     Vitals: Blood pressure 107/54, pulse 74, temperature 97 8 °F (36 6 °C), temperature source Oral, resp  rate 18, height 6' (1 829 m), weight 136 kg (299 lb 13 2 oz), SpO2 94 %  , Body mass index is 40 66 kg/m² ,   Orthostatic Blood Pressures      Most Recent Value   Blood Pressure  107/54 filed at 09/22/2020 0500   Patient Position - Orthostatic VS  Lying filed at 09/22/2020 0300            Intake/Output Summary (Last 24 hours) at 9/22/2020 0846  Last data filed at 9/22/2020 0741  Gross per 24 hour   Intake 3477 52 ml   Output 4025 ml   Net -547 48 ml         Physical Exam:    GEN: Luisa Esparza appears well, alert and oriented x 3, pleasant and cooperative   HEENT:  Normocephalic, atraumatic, anicteric, moist mucous membranes  NECK: No JVD or carotid bruits   HEART: Regular rhythm, normal rate, normal S1 and S2, no murmurs, clicks, gallops or rubs; chest wall tender to palpation   LUNGS: Clear to auscultation bilaterally; no wheezes, rales, or rhonchi; respiration nonlabored   ABDOMEN:  Normoactive bowel sounds, soft, no tenderness, no distention  EXTREMITIES: peripheral pulses palpable; no edema  NEURO: no gross focal findings; cranial nerves grossly intact   SKIN:  Dry, intact, warm to touch      Current Facility-Administered Medications:     acetaminophen (TYLENOL) tablet 650 mg, 650 mg, Oral, Q4H PRN, Casey Pimentel MD    al mag oxide-diphenhydramine-lidocaine viscous (MAGIC MOUTHWASH) suspension 10 mL, 10 mL, Swish & Swallow, Q4H PRN, Casey Pimentel MD, 10 mL at 09/22/20 0147    AMILoride tablet 5 mg, 5 mg, Oral, BID, Peggy Garza DO, 5 mg at 09/22/20 6198    amiodarone (CORDARONE) 900 mg in dextrose 5 % 500 mL infusion, 1 mg/min, Intravenous, Continuous, Drew Pimentel MD, Last Rate: 33 3 mL/hr at 09/21/20 1654, 1 mg/min at 09/21/20 1654    ampicillin (OMNIPEN) 2,000 mg in sodium chloride 0 9 % 100 mL IVPB, 2,000 mg, Intravenous, Q6H, Drew Pimentel MD, Last Rate: 200 mL/hr at 09/22/20 0509, 2,000 mg at 09/22/20 0509    aspirin chewable tablet 81 mg, 81 mg, Oral, Daily, Isabella Pimentel MD, 81 mg at 09/22/20 0809    atorvastatin (LIPITOR) tablet 80 mg, 80 mg, Oral, Daily With Patito Chung MD, 80 mg at 09/21/20 1614    fentanyl citrate (PF) 100 MCG/2ML 25 mcg, 25 mcg, Intravenous, Q1H PRN, Isabella Pimentel MD, 25 mcg at 09/22/20 0807    ferrous sulfate tablet 325 mg, 325 mg, Oral, Every Other Day, Rox Dominguez MD, 325 mg at 09/21/20 0820    fish oil capsule 1,000 mg, 1,000 mg, Oral, Daily, Drew Pimentel MD, 1,000 mg at 09/22/20 0810    fluticasone (FLONASE) 50 mcg/act nasal spray 2 spray, 2 spray, Each Nare, Daily, Isabella Pimentel MD, 2 spray at 09/22/20 0811    gabapentin (NEURONTIN) capsule 600 mg, 600 mg, Oral, TID, Drew Pimentel MD, 600 mg at 09/22/20 0810    insulin glargine (LANTUS) subcutaneous injection 30 Units 0 3 mL, 30 Units, Subcutaneous, Q12H [facility], Yesenia Turner MD, 30 Units at 09/22/20 0809    insulin lispro (HumaLOG) 100 units/mL subcutaneous injection 2-12 Units, 2-12 Units, Subcutaneous, TID AC, 2 Units at 09/21/20 1618 **AND** Fingerstick Glucose (POCT), , , TID AC, Drew Pimentel MD    insulin lispro (HumaLOG) 100 units/mL subcutaneous injection 20 Units, 20 Units, Subcutaneous, TID With Meals, Yesenia Turner MD, 20 Units at 09/22/20 0811    isosorbide mononitrate (IMDUR) 24 hr tablet 60 mg, 60 mg, Oral, Daily, Drew Pimentel MD, 60 mg at 09/22/20 0810    melatonin tablet 6 mg, 6 mg, Oral, HS, Drew Pimentel MD, 6 mg at 09/21/20 2201    oxyCODONE (Stewart Pollen) IR tablet 5 mg, 5 mg, Oral, Q6H PRN, Nitish Pimentel MD, 5 mg at 09/22/20 5230    pantoprazole (PROTONIX) EC tablet 40 mg, 40 mg, Oral, Early Morning, Drew Pimentel MD, 40 mg at 09/22/20 0509    polyethylene glycol (MIRALAX) packet 17 g, 17 g, Oral, Daily, Nitish Pimentel MD, 17 g at 09/22/20 0810    rivaroxaban (XARELTO) tablet 20 mg, 20 mg, Oral, Daily With Lazarus Najera MD, 20 mg at 09/21/20 1630    senna (SENOKOT) tablet 8 6 mg, 1 tablet, Oral, HS PRN, Nitish Pimentel MD    Labs & Results:    Lab Results   Component Value Date    CKTOTAL 160 02/24/2014    CKTOTAL 130 02/24/2014    CKTOTAL 148 02/23/2014    TROPONINI 2 07 (H) 09/21/2020    TROPONINI 2 49 (H) 09/21/2020    TROPONINI 2 38 (H) 09/21/2020       Lab Results   Component Value Date    GLUCOSE 167 (H) 09/21/2020    CALCIUM 8 6 09/22/2020     04/26/2014    K 3 8 09/22/2020    CO2 31 09/22/2020     09/22/2020    BUN 18 09/22/2020    CREATININE 1 56 (H) 09/22/2020       Lab Results   Component Value Date    WBC 9 04 09/22/2020    HGB 12 1 09/22/2020    HCT 38 8 09/22/2020    MCV 82 09/22/2020     09/22/2020     Results from last 7 days   Lab Units 09/20/20  2357   INR  1 97*       No results found for: CHOL  No results found for: HDL  No results found for: LDLCALC  No results found for: TRIG    Lab Results   Component Value Date    ALT 26 09/21/2020    AST 30 09/21/2020       Telemetry:   Personally reviewed by Kelton Calderon MD:  Sinus rhythm with heart rate ranging 60-70 with no ectopy appreciated    Imaging:  No new cardiac images to be reviewed      VTE Prophylaxis:  Rivaroxaban      Assessment:  Principal Problem:    Ventricular tachycardia ([address] )  Active Problems:    Hypokalemia    CAD (coronary artery disease)    CKD (chronic kidney disease)    CHF (congestive heart failure) (HCC)    Elevated troponin    Obstructive sleep apnea      1   Ventricular fibrillation store s/p 55 ICD shocks  -multifactorial etiology: metolazone, torsemide, cetuximab and to lesser extent insulin  -[employer] Visia SC-ICD initially implanted 04/2009 with generator upgrade to MRI conditional device 11/2017  -R on T phenomenon preceding each episode  -amiodarone drip; lidocaine discontinued  -K 1 8 -> 3 8 s/p 400 mEq of KCl  -no further recurrence on telemetry at [facility]  -battery life documented 7 4 years as of June, down to 16 months on 9/21 and 9/22     2  Mixed ischemic/nonischemic cardiomyopathy  -known CAD, alcohol use, JONNA on CPAP  -4/2019 Lexiscan:  Mid inferior/inferolateral infarct, gated EF 34%, dilated and inferior akinesis  -09/2019 TTE:  LVEF 50%, LVIDd 6 4 cm, normal RV size/function, LA 4 7 cm; no significant valvulopathy  -CAD: Aspirin, atorvastatin, isosorbide mononitrate  -CHF: amiloride; home torsemide held, not on beta-blockers or Ace as per last [facility] note  -presenting NT-proBNP 614, can be falsely low due to obesity     3  Paroxysmal atrial fibrillation  -currently in sinus mechanism  -RF: JONNA, previous alcohol use, obesity  -[id number] 4, HAS-BLED 2  -LA 4 7 cm  -rivaroxaban, amiodarone infusion      Plan:  1  Patient's battery life remaining stable at 16 months on repeat interrogation today  No further episodes of ventricular arrhythmias (VT or VF) on telemetry  Reintroduced oral amiodarone at 200 mg b i d  with plan to wean off drip  2  Pre arrhythmia EGM was switched off and battery life will be checked again with interrogation tomorrow  3  Potassium balance remains of the utmost importance which nephrology and advanced heart failure team are assisting with  4  EP to continue follow along        Case discussed and reviewed with Dr Jake Rahman who agrees with my assessment and plan  Thank you for involving us in the care of your patient  Epic/ [facility]\""/Care Everywhere records reviewed: yes    ** Please Note: Fluency DirectDictation voice to text software may have been used in the creation of this document  **

## 2020-09-22 NOTE — PROGRESS NOTES
Daily Progress Note - [address] 62 y o  male MRN: [id number]  Unit/Bed#: MICU 02 Encounter: [id number]        ----------------------------------------------------------------------------------------  HPI/24hr events: No acute events overnight  Patient given an additional 60meg K+ for potassium 3 5      ---------------------------------------------------------------------------------------  SUBJECTIVE  No acute events overnight per patient or per nursing  Patient reports improved pain at this time  He reports that the food is bland, but edible  He reports that he now wears BiPAP due to significant noise which kept him from sleep  No other complaints at this time    Review of Systems  Review of systems was reviewed and negative unless stated above in HPI/24-hour events   ---------------------------------------------------------------------------------------  Assessment and Plan:    Neuro:    Diagnosis: No active issues  o Plan: CAM ICU; Delirium precautions   Diagnosis: Analgesia  o Plan: Gabapentin tid, tylenol, oxycodone, fentanyl prn  CV:    Diagnosis: V tach: in the setting of hypokalemia  S/p repletion with K now 3 8    o Plan: Goal K 4-4 5 per EP with Mg goal 2-2 5  HF and nephro assisting with diuretics as noted below  EP plans to check device daily for the next few days  Consider Discontinuation of amiodarone   Diagnosis: HTN: BP well controlled at this time  o Plan: Continue current management   Diagnosis: CAD  o Plan: Continue Aspirin, Imdur and Lipitor   Diagnosis: Cardiomyopathy  o Plan: Management as per CAD  Also continue ETOH cessation   Diagnosis: A fib  o Plan: Rates well controlled overnight  Continue AC with xarelto   Diagnosis: Elevated trop: in the setting of multiple ICD shocks/v tach  EKG wnl  Likely type 2    o Plan: Now downtrending, will stop trending  Pulm:   Diagnosis: JONNA  o Plan: Bipap at night        GI:    Diagnosis: GERD  o Plan: PPI   Diagnosis: Constipation  o Plan: Miralax and senna      :    Diagnosis: CKD, with Cr at baseline  o Plan: Continue to monitor BMP  Nephro on board assisting with management  Avoid nephrotoxins as able  Monitor Is and Os  F/E/N:    F: NA   E: Significant hypokalemia secondary to diuretic use  Patient initiated on K sparing diuretic overnight per nephro and hf recs  K+ today 3 8  Will replete with 20 meq kdur  Patient will need Other home diuretics initiated prior to D/C and will need to be monitored and have K+ supplementation  Will discuss appropriate time to initiate home diuretics with nephro today   N: Pratik/CHO controlled diet  Heme/Onc:    Diagnosis: SCC tongue s/p partial resection  o Plan: Repeat [facility]/A/[facility] at [facility] without signs of active disease  Continue outpatient follow up  Endo:    Diagnosis: DM2  With A1C 7 7    o Plan: Continue Lantus 30 Bid and meal time insulin 20u with meals  SSI  ID:    Diagnosis: Questionable strep bacteremia  With 1/2 bcx positive at outside hosp  On ampicillin through PICC thorugh 9/22 per ID  Will discontinue Ampicillin after today and order repeat BCX after to confirm resolution  MSK/Skin:    Diagnosis: No active issues  o Plan: Will order PT/OT  Disposition: Transfer to Stepdown Level 2  Code Status: Level 1 - Full Code  ---------------------------------------------------------------------------------------  ICU CORE MEASURES    Prophylaxis   VTE Pharmacologic Prophylaxis: Rivaroxaban (Xarelto)  VTE Mechanical Prophylaxis: sequential compression device  Stress Ulcer Prophylaxis: Omeprazole PO    ABCDE Protocol (if indicated)  Plan to perform spontaneous awakening trial today? Not applicable  Plan to perform spontaneous breathing trial today? Not applicable  Obvious barriers to extubation?  Not applicable  CAM-ICU: Negative    Invasive Devices Review  Invasive Devices     Peripherally Inserted Central Catheter Line PICC Line Right -- days          Peripheral Intravenous Line            Peripheral IV Right Antecubital -- days    Peripheral IV 20 Left Forearm 1 day              Can any invasive devices be discontinued today? Not applicable  ---------------------------------------------------------------------------------------  OBJECTIVE    Vitals   Vitals:    20 0100 20 0300 20 0500 20 0551   BP: 106/59 103/55 107/54    BP Location:  Left arm     Pulse: 72 70 74    Resp: 12 20 18    Temp:       TempSrc:       SpO2: 94% 95% 94%    Weight:    136 kg (299 lb 13 2 oz)   Height:         Temp (24hrs), Av 8 °F (36 6 °C), Min:97 7 °F (36 5 °C), Max:97 9 °F (36 6 °C)  Current: Temperature: 97 8 °F (36 6 °C)  HR: 74  BP: 107/54  RR: 18  SpO2: 94    Respiratory:  SpO2: SpO2: 94 %       Invasive/non-invasive ventilation settings   Respiratory    Lab Data (Last 4 hours)    None         O2/Vent Data (Last 4 hours)    None                Physical Exam    Vitals signs and nursing note reviewed  Constitutional:       General: He is not in acute distress  Appearance: He is obese  He is not ill-appearing  HENT:      Head: Normocephalic and atraumatic  Right Ear: External ear normal       Left Ear: External ear normal       Nose: Nose normal       Mouth/Throat:   Eyes:      Extraocular Movements: Extraocular movements intact  Conjunctiva/sclera: Conjunctivae normal       Pupils: Pupils are equal, round, and reactive to light  Neck:      Musculoskeletal: Normal range of motion and neck supple  Cardiovascular:      Rate and Rhythm: Normal rate  Rhythm regular     Heart sounds: Normal heart sounds  No murmur  Pulmonary:      Effort: Pulmonary effort is normal  No respiratory distress  Breath sounds: Normal breath sounds  Abdominal:      Comments: Obese abdomen, distended but soft no rebound or guarding no tenderness   Musculoskeletal: Normal range of motion           General: No swelling, tenderness or deformity  Skin:     General: Skin is warm and dry  Capillary Refill: Capillary refill takes less than 2 seconds  Neurological:      General: No focal deficit present  Mental Status: He is alert and oriented to person, place, and time     Psychiatric:         Mood and Affect: Mood normal          Behavior: Behavior normal      Laboratory and Diagnostics:  Results from last 7 days   Lab Units 09/22/20  0508 09/21/20  0427 09/21/20  0006 09/20/20  2357   WBC Thousand/uL 9 04 12 39*  --  15 34*   HEMOGLOBIN g/dL 12 1 11 8*  --  12 8   I STAT HEMOGLOBIN g/dl  --   --  13 9  --    HEMATOCRIT % 38 8 38 6  --  40 9   HEMATOCRIT, ISTAT %  --   --  41  --    PLATELETS Thousands/uL 238 259  --  343   NEUTROS PCT %  --  92*  --  69   MONOS PCT %  --  5  --  9     Results from last 7 days   Lab Units 09/22/20  0508 09/22/20  0227 09/21/20  2050 09/21/20  1402 09/21/20  0427 09/21/20  0006 09/20/20  2357   SODIUM mmol/L 141 141 138 136 135*  --  137   POTASSIUM mmol/L 3 8 3 9 3 5 3 0* 2 7*  --  1 8*   CHLORIDE mmol/L 108 107 103 100 96*  --  94*   CO2 mmol/L 31 30 29 29 32  --  28   CO2, I-STAT mmol/L  --   --   --   --   --  32  --    ANION GAP mmol/L 2* 4 6 7 7  --  15*   BUN mg/dL 18 20 20 20 23  --  28*   CREATININE mg/dL 1 56* 1 65* 1 76* 1 54* 1 75*  --  2 15*   CALCIUM mg/dL 8 6 8 3 8 4 8 3 8 2*  --  8 6   GLUCOSE RANDOM mg/dL 108 127 194* 173* 228*  --  135   ALT U/L  --   --   --  26 29  --  28   AST U/L  --   --   --  30 28  --  23   ALK PHOS U/L  --   --   --  135* 142*  --  148*   ALBUMIN g/dL  --   --   --  2 7* 3 0*  --  3 3*   TOTAL BILIRUBIN mg/dL  --   --   --  0 40 0 35  --  0 33     Results from last 7 days   Lab Units 09/21/20  0427 09/20/20  2357   MAGNESIUM mg/dL 2 6 1 8   PHOSPHORUS mg/dL 3 4 1 7*      Results from last 7 days   Lab Units 09/20/20  2357   INR  1 97*   PTT seconds 34      Results from last 7 days   Lab Units 09/21/20  1618 09/21/20  1359 09/21/20  0941 09/21/20  0427 09/20/20  2357   TROPONIN I ng/mL 2 07* 2 49* 2 38* 2 05* 0 08*         ABG:    VBG:          Micro        EKG: NA  Imaging:  I have personally reviewed pertinent reports  Intake and Output  I/O       09/20 0701 - 09/21 0700 09/21 0701 - 09/22 0700    P  O   1920    I V  (mL/kg)  1572 8 (11 6)    IV Piggyback  800    Total Intake(mL/kg)  4292 8 (31 6)    Urine (mL/kg/hr) 1400 4075 (1 2)    Total Output 1400 4075    Net -1400 +217 8              UOP: 1 26 ml/hr     Height and Weights   Height: 6' (182 9 cm)  IBW: 77 6 kg  Body mass index is 40 66 kg/m²  Weight (last 2 days)     Date/Time   Weight    09/22/20 0551   136 (299 83)    09/21/20 0800   132 (291 89)    09/21/20 0409   132 (291 89)                Nutrition       Diet Orders   (From admission, onward)             Start     Ordered    09/21/20 1347  Diet Pratik/CHO Controlled; Consistent Carbohydrate Diet Level 2 (5 carb servings/75 grams CHO/meal)  Diet effective now     Question Answer Comment   Diet Type Pratik/CHO Controlled    Pratik/CHO Controlled Consistent Carbohydrate Diet Level 2 (5 carb servings/75 grams CHO/meal)    RD to adjust diet per protocol? No        09/21/20 1346              TF currently running at na ml/hr with a goal of na ml/hr   Formula: na      Active Medications  Scheduled Meds:  Current Facility-Administered Medications   Medication Dose Route Frequency Provider Last Rate    acetaminophen  650 mg Oral Q4H PRN Adam Pimentel MD      al mag oxide-diphenhydramine-lidocaine viscous  10 mL Swish & Swallow Q4H PRN Adam Pimentel MD      AMILoride  5 mg Oral BID Peggy Garza DO      amiodarone  1 mg/min Intravenous Continuous Drew Pimentel MD 1 mg/min (09/21/20 4214)    ampicillin  2,000 mg Intravenous Q6H Drew Pimentel MD 2,000 mg (09/22/20 9849)    aspirin  81 mg Oral Daily Adam Pimentel MD      atorvastatin  80 mg Oral Daily With Health Enrike Pimentel MD      fentanyl citrate (PF)  25 mcg Intravenous Q1H PRN Wero Sieving Check, MD      ferrous sulfate  325 mg Oral Every Other Day Wero Sieving Check, MD      fish oil  1,000 mg Oral Daily Wero Sieving Check, MD      fluticasone  2 spray Each Nare Daily Wero Sieving Check, MD      gabapentin  600 mg Oral TID Wero Sieving Check, MD      insulin glargine  30 Units Subcutaneous Q12H [address] Yola Smith MD      insulin lispro  2-12 Units Subcutaneous TID [facility] Wero Sieving Check, MD     [address] [place] [address] insulin lispro  20 Units Subcutaneous TID With Meals Yola Smith MD      isosorbide mononitrate  60 mg Oral Daily Wero Sieving Check, MD      melatonin  6 mg Oral HS Wero Sieving Check, MD      oxyCODONE  5 mg Oral Q6H PRN Wero Sieving Check, MD      pantoprazole  40 mg Oral Early Morning Wero Sieving Check, MD      polyethylene glycol  17 g Oral Daily Wero Sieving Check, MD      rivaroxaban  20 mg Oral Daily With Dinner Wero Sieving Check, MD      senna  1 tablet Oral HS PRN Wero Sieving Check, MD       Continuous Infusions:  amiodarone, 1 mg/min, Last Rate: 1 mg/min (09/21/20 2255)      PRN Meds:   acetaminophen, 650 mg, Q4H PRN  al mag oxide-diphenhydramine-lidocaine viscous, 10 mL, Q4H PRN  fentanyl citrate (PF), 25 mcg, Q1H PRN  oxyCODONE, 5 mg, Q6H PRN  senna, 1 tablet, HS PRN        Allergies   Allergies   Allergen Reactions    Shellfish-Derived Products     Spironolactone      ---------------------------------------------------------------------------------------  Advance Directive and Living Will:      Power of :    POLST:    ---------------------------------------------------------------------------------------  Care Time Delivered:   No Critical Care time spent     Yola Smith MD      Portions of the record may have been created with voice recognition software  Occasional wrong word or "sound a like" substitutions may have occurred due to the inherent limitations of voice recognition software    Read the chart carefully and recognize, using context, where substitutions have occurred

## 2020-09-22 NOTE — PLAN OF CARE
Problem: Prexisting or High Potential for Compromised Skin Integrity  Goal: Skin integrity is maintained or improved  Description: INTERVENTIONS:  - Identify patients at risk for skin breakdown  - Assess and monitor skin integrity  - Assess and monitor nutrition and hydration status  - Monitor labs   - Assess for incontinence   - Turn and reposition patient  - Assist with mobility/ambulation  - Relieve pressure over bony prominences  - Avoid friction and shearing  - Provide appropriate hygiene as needed including keeping skin clean and dry  - Evaluate need for skin moisturizer/barrier cream  - Collaborate with interdisciplinary team   - Patient/family teaching  - Consider wound care consult   Outcome: Progressing     Problem: Potential for Falls  Goal: Patient will remain free of falls  Description: INTERVENTIONS:  - Assess patient frequently for physical needs  -  Identify cognitive and physical deficits and behaviors that affect risk of falls    -  [place] fall precautions as indicated by assessment   - Educate patient/family on patient safety including physical limitations  - Instruct patient to call for assistance with activity based on assessment  - Modify environment to reduce risk of injury  - Consider OT/PT consult to assist with strengthening/mobility  Outcome: Progressing     Problem: PAIN - ADULT  Goal: Verbalizes/displays adequate comfort level or baseline comfort level  Description: Interventions:  - Encourage patient to monitor pain and request assistance  - Assess pain using appropriate pain scale  - Administer analgesics based on type and severity of pain and evaluate response  - Implement non-pharmacological measures as appropriate and evaluate response  - Consider cultural and social influences on pain and pain management  - Notify physician/advanced practitioner if interventions unsuccessful or patient reports new pain  Outcome: Progressing     Problem: INFECTION - ADULT  Goal: Absence or prevention of progression during hospitalization  Description: INTERVENTIONS:  - Assess and monitor for signs and symptoms of infection  - Monitor lab/diagnostic results  - Monitor all insertion sites, i e  indwelling lines, tubes, and drains  - Administer medications as ordered  - Instruct and encourage patient and family to use good hand hygiene technique  - Identify and instruct in appropriate isolation precautions for identified infection/condition  Outcome: Progressing     Problem: SAFETY ADULT  Goal: Patient will remain free of falls  Description: INTERVENTIONS:  - Assess patient frequently for physical needs  -  Identify cognitive and physical deficits and behaviors that affect risk of falls  -  [place] fall precautions as indicated by assessment   - Educate patient/family on patient safety including physical limitations  - Instruct patient to call for assistance with activity based on assessment  - Modify environment to reduce risk of injury  - Consider OT/PT consult to assist with strengthening/mobility  Outcome: Progressing     Problem: Knowledge Deficit  Goal: Patient/family/caregiver demonstrates understanding of disease process, treatment plan, medications, and discharge instructions  Description: Complete learning assessment and assess knowledge base    Interventions:  - Provide teaching at level of understanding  - Provide teaching via preferred learning methods  Outcome: Progressing     Problem: CARDIOVASCULAR - ADULT  Goal: Maintains optimal cardiac output and hemodynamic stability  Description: INTERVENTIONS:  - Monitor I/O, vital signs and rhythm  - Monitor for S/S and trends of decreased cardiac output  - Administer and titrate ordered vasoactive medications to optimize hemodynamic stability  - Assess quality of pulses, skin color and temperature  - Assess for signs of decreased coronary artery perfusion  - Instruct patient to report change in severity of symptoms  Outcome: Progressing  Goal: Absence of cardiac dysrhythmias or at baseline rhythm  Description: INTERVENTIONS:  - Continuous cardiac monitoring, vital signs, obtain 12 lead EKG if ordered  - Administer antiarrhythmic and heart rate control medications as ordered  - Monitor electrolytes and administer replacement therapy as ordered  Outcome: Progressing

## 2020-09-23 PROBLEM — E11.9 TYPE 2 DIABETES MELLITUS, WITH LONG-TERM CURRENT USE OF INSULIN (HCC): Status: ACTIVE | Noted: 2020-09-23

## 2020-09-23 PROBLEM — I42.9 CARDIOMYOPATHY (HCC): Status: ACTIVE | Noted: 2020-09-23

## 2020-09-23 PROBLEM — Z79.4 TYPE 2 DIABETES MELLITUS, WITH LONG-TERM CURRENT USE OF INSULIN (HCC): Status: ACTIVE | Noted: 2020-09-23

## 2020-09-23 PROBLEM — N18.30 STAGE 3 CHRONIC KIDNEY DISEASE (HCC): Status: ACTIVE | Noted: 2020-09-22

## 2020-09-23 PROBLEM — I48.0 PAROXYSMAL A-FIB (HCC): Status: ACTIVE | Noted: 2020-09-23

## 2020-09-23 LAB
ANION GAP SERPL CALCULATED.3IONS-SCNC: 4 MMOL/L (ref 4–13)
ANION GAP SERPL CALCULATED.3IONS-SCNC: 5 MMOL/L (ref 4–13)
BUN SERPL-MCNC: 17 MG/DL (ref 5–25)
BUN SERPL-MCNC: 20 MG/DL (ref 5–25)
CALCIUM SERPL-MCNC: 8.8 MG/DL (ref 8.3–10.1)
CALCIUM SERPL-MCNC: 8.9 MG/DL (ref 8.3–10.1)
CHLORIDE SERPL-SCNC: 106 MMOL/L (ref 100–108)
CHLORIDE SERPL-SCNC: 110 MMOL/L (ref 100–108)
CO2 SERPL-SCNC: 26 MMOL/L (ref 21–32)
CO2 SERPL-SCNC: 28 MMOL/L (ref 21–32)
CREAT SERPL-MCNC: 1.5 MG/DL (ref 0.6–1.3)
CREAT SERPL-MCNC: 1.55 MG/DL (ref 0.6–1.3)
ERYTHROCYTE [DISTWIDTH] IN BLOOD BY AUTOMATED COUNT: 17.1 % (ref 11.6–15.1)
GFR SERPL CREATININE-BSD FRML MDRD: 48 ML/MIN/1.73SQ M
GFR SERPL CREATININE-BSD FRML MDRD: 50 ML/MIN/1.73SQ M
GLUCOSE SERPL-MCNC: 107 MG/DL (ref 65–140)
GLUCOSE SERPL-MCNC: 113 MG/DL (ref 65–140)
GLUCOSE SERPL-MCNC: 116 MG/DL (ref 65–140)
GLUCOSE SERPL-MCNC: 122 MG/DL (ref 65–140)
GLUCOSE SERPL-MCNC: 133 MG/DL (ref 65–140)
GLUCOSE SERPL-MCNC: 160 MG/DL (ref 65–140)
GLUCOSE SERPL-MCNC: 95 MG/DL (ref 65–140)
HCT VFR BLD AUTO: 42 % (ref 36.5–49.3)
HGB BLD-MCNC: 12.7 G/DL (ref 12–17)
MAGNESIUM SERPL-MCNC: 2.1 MG/DL (ref 1.6–2.6)
MCH RBC QN AUTO: 25.4 PG (ref 26.8–34.3)
MCHC RBC AUTO-ENTMCNC: 30.2 G/DL (ref 31.4–37.4)
MCV RBC AUTO: 84 FL (ref 82–98)
PLATELET # BLD AUTO: 262 THOUSANDS/UL (ref 149–390)
PMV BLD AUTO: 10.1 FL (ref 8.9–12.7)
POTASSIUM SERPL-SCNC: 3.8 MMOL/L (ref 3.5–5.3)
POTASSIUM SERPL-SCNC: 4.3 MMOL/L (ref 3.5–5.3)
RBC # BLD AUTO: 5 MILLION/UL (ref 3.88–5.62)
SODIUM SERPL-SCNC: 139 MMOL/L (ref 136–145)
SODIUM SERPL-SCNC: 140 MMOL/L (ref 136–145)
WBC # BLD AUTO: 6.51 THOUSAND/UL (ref 4.31–10.16)

## 2020-09-23 PROCEDURE — 87040 BLOOD CULTURE FOR BACTERIA: CPT | Performed by: INTERNAL MEDICINE

## 2020-09-23 PROCEDURE — 80048 BASIC METABOLIC PNL TOTAL CA: CPT | Performed by: INTERNAL MEDICINE

## 2020-09-23 PROCEDURE — NC001 PR NO CHARGE: Performed by: INTERNAL MEDICINE

## 2020-09-23 PROCEDURE — 82948 REAGENT STRIP/BLOOD GLUCOSE: CPT

## 2020-09-23 PROCEDURE — 94760 N-INVAS EAR/PLS OXIMETRY 1: CPT

## 2020-09-23 PROCEDURE — 83735 ASSAY OF MAGNESIUM: CPT | Performed by: INTERNAL MEDICINE

## 2020-09-23 PROCEDURE — 99233 SBSQ HOSP IP/OBS HIGH 50: CPT | Performed by: INTERNAL MEDICINE

## 2020-09-23 PROCEDURE — 85027 COMPLETE CBC AUTOMATED: CPT | Performed by: INTERNAL MEDICINE

## 2020-09-23 RX ORDER — METHOCARBAMOL 750 MG/1
750 TABLET, FILM COATED ORAL EVERY 6 HOURS PRN
Status: DISCONTINUED | OUTPATIENT
Start: 2020-09-23 | End: 2020-09-24

## 2020-09-23 RX ORDER — LORAZEPAM 0.5 MG/1
0.5 TABLET ORAL EVERY 8 HOURS PRN
Status: DISCONTINUED | OUTPATIENT
Start: 2020-09-23 | End: 2020-09-26 | Stop reason: HOSPADM

## 2020-09-23 RX ORDER — ISOSORBIDE MONONITRATE 30 MG/1
30 TABLET, EXTENDED RELEASE ORAL DAILY
Status: DISCONTINUED | OUTPATIENT
Start: 2020-09-24 | End: 2020-09-26 | Stop reason: HOSPADM

## 2020-09-23 RX ORDER — TORSEMIDE 20 MG/1
60 TABLET ORAL
Status: DISCONTINUED | OUTPATIENT
Start: 2020-09-23 | End: 2020-09-26 | Stop reason: HOSPADM

## 2020-09-23 RX ADMIN — LIDOCAINE HYDROCHLORIDE 10 ML: 20 SOLUTION ORAL; TOPICAL at 23:42

## 2020-09-23 RX ADMIN — GABAPENTIN 600 MG: 300 CAPSULE ORAL at 20:15

## 2020-09-23 RX ADMIN — OXYCODONE HYDROCHLORIDE 5 MG: 5 TABLET ORAL at 12:56

## 2020-09-23 RX ADMIN — AMILORIDE HYDROCLORIDE 5 MG: 5 TABLET ORAL at 11:17

## 2020-09-23 RX ADMIN — ACETAMINOPHEN 650 MG: 325 TABLET, FILM COATED ORAL at 11:21

## 2020-09-23 RX ADMIN — AMILORIDE HYDROCLORIDE 5 MG: 5 TABLET ORAL at 20:16

## 2020-09-23 RX ADMIN — FERROUS SULFATE TAB 325 MG (65 MG ELEMENTAL FE) 325 MG: 325 (65 FE) TAB at 08:41

## 2020-09-23 RX ADMIN — ACETAMINOPHEN 650 MG: 325 TABLET, FILM COATED ORAL at 02:35

## 2020-09-23 RX ADMIN — AMIODARONE HYDROCHLORIDE 200 MG: 200 TABLET ORAL at 17:30

## 2020-09-23 RX ADMIN — OMEGA-3 FATTY ACIDS CAP 1000 MG 1000 MG: 1000 CAP at 08:41

## 2020-09-23 RX ADMIN — MELATONIN 6 MG: at 22:38

## 2020-09-23 RX ADMIN — OXYCODONE HYDROCHLORIDE 5 MG: 5 TABLET ORAL at 22:38

## 2020-09-23 RX ADMIN — ACETAMINOPHEN 650 MG: 325 TABLET, FILM COATED ORAL at 15:11

## 2020-09-23 RX ADMIN — LORAZEPAM 0.5 MG: 0.5 TABLET ORAL at 17:31

## 2020-09-23 RX ADMIN — INSULIN LISPRO 20 UNITS: 100 INJECTION, SOLUTION INTRAVENOUS; SUBCUTANEOUS at 17:31

## 2020-09-23 RX ADMIN — GABAPENTIN 600 MG: 300 CAPSULE ORAL at 08:41

## 2020-09-23 RX ADMIN — LIDOCAINE 5% 2 PATCH: 700 PATCH TOPICAL at 08:41

## 2020-09-23 RX ADMIN — POLYETHYLENE GLYCOL 3350 17 G: 17 POWDER, FOR SOLUTION ORAL at 08:39

## 2020-09-23 RX ADMIN — ATORVASTATIN CALCIUM 80 MG: 80 TABLET, FILM COATED ORAL at 17:30

## 2020-09-23 RX ADMIN — TORSEMIDE 60 MG: 20 TABLET ORAL at 15:08

## 2020-09-23 RX ADMIN — INSULIN LISPRO 20 UNITS: 100 INJECTION, SOLUTION INTRAVENOUS; SUBCUTANEOUS at 07:42

## 2020-09-23 RX ADMIN — POTASSIUM CHLORIDE 20 MEQ: 1500 TABLET, EXTENDED RELEASE ORAL at 17:30

## 2020-09-23 RX ADMIN — PANTOPRAZOLE SODIUM 40 MG: 40 TABLET, DELAYED RELEASE ORAL at 05:00

## 2020-09-23 RX ADMIN — Medication 12.5 MG: at 20:15

## 2020-09-23 RX ADMIN — OXYCODONE HYDROCHLORIDE 5 MG: 5 TABLET ORAL at 17:35

## 2020-09-23 RX ADMIN — OXYCODONE HYDROCHLORIDE 5 MG: 5 TABLET ORAL at 04:59

## 2020-09-23 RX ADMIN — RIVAROXABAN 20 MG: 20 TABLET, FILM COATED ORAL at 17:30

## 2020-09-23 RX ADMIN — OXYCODONE HYDROCHLORIDE 5 MG: 5 TABLET ORAL at 08:40

## 2020-09-23 RX ADMIN — INSULIN GLARGINE 30 UNITS: 100 INJECTION, SOLUTION SUBCUTANEOUS at 22:38

## 2020-09-23 RX ADMIN — INSULIN LISPRO 2 UNITS: 100 INJECTION, SOLUTION INTRAVENOUS; SUBCUTANEOUS at 17:31

## 2020-09-23 RX ADMIN — INSULIN GLARGINE 30 UNITS: 100 INJECTION, SOLUTION SUBCUTANEOUS at 08:55

## 2020-09-23 RX ADMIN — POTASSIUM CHLORIDE 20 MEQ: 1500 TABLET, EXTENDED RELEASE ORAL at 08:41

## 2020-09-23 RX ADMIN — GABAPENTIN 600 MG: 300 CAPSULE ORAL at 17:30

## 2020-09-23 RX ADMIN — AMIODARONE HYDROCHLORIDE 200 MG: 200 TABLET ORAL at 08:41

## 2020-09-23 RX ADMIN — LORAZEPAM 0.5 MG: 0.5 TABLET ORAL at 22:38

## 2020-09-23 RX ADMIN — METHOCARBAMOL TABLETS 750 MG: 750 TABLET, COATED ORAL at 20:15

## 2020-09-23 RX ADMIN — FLUTICASONE PROPIONATE 2 SPRAY: 50 SPRAY, METERED NASAL at 11:19

## 2020-09-23 RX ADMIN — ASPIRIN 81 MG CHEWABLE TABLET 81 MG: 81 TABLET CHEWABLE at 08:41

## 2020-09-23 RX ADMIN — INSULIN LISPRO 20 UNITS: 100 INJECTION, SOLUTION INTRAVENOUS; SUBCUTANEOUS at 11:16

## 2020-09-23 NOTE — ASSESSMENT & PLAN NOTE
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

## 2020-09-23 NOTE — PROGRESS NOTES
NEPHROLOGY PROGRESS NOTE   Aby Wadsworth 62 y o  male MRN: [id number]  Unit/Bed#: [facility] 727-01 Encounter: [id number]    ASSESSMENT & PLAN:  60-year-old male with history of CHF ejection fraction 40%, AFib on Xarelto sleep apnea, history of squamous cell carcinoma of the time on cetuximab/RT presented with complain of recurrent firing of ICD  He was found to be having VT episodes  Was given amiodarone bolus followed by amiodarone drip and Lidocaine infusion on admission in ICU  Nephrology consulted for hypokalemia  Follows outpatient with Dr Marija Valladares and was on amiloride 10 mg in the past but has been off it recently  Hypokalemia, likely from renal loss with the use of diuretics  -potassium level was 1 8 on 09/20  -potassium today 4 3   -hypokalemia resolved with replacement as well as with initiation of Amiloride 5 mg twice daily since 09/22  -continue amiloride 5 mg twice daily, potassium chloride 20 mEq twice daily  In the past has been intolerant of spironolactone as it caused hypotension  -okay to restart torsemide, will defer to Cardiology , continue to monitor electrolytes    Chronic kidney disease stage 3  -recent baseline creatinine around 1 4-2 3  -renal function has been fluctuating based on volume status   -chronic kidney disease likely due to cardiorenal syndrome, age-related nephron loss  -currently renal function stable at creatinine 1 5, continue to monitor    Fluid overload/cardiomyopathy with ejection fraction 40% to 45%  -has trace lower extremity edema but weight has been stable    -as outpatient has been on torsemide 60 mg twice daily along with metolazone 2 5 mg twice a week  -patient is being followed by heart failure team   Court Monk to resume loop diuretics  Defer to Cardiology  Ventricular tachycardia and ICD shocks:  Continue management per Cardiology  If P following with daily interrogation    Suspected to be secondary to hypokalemia on presentation    Diabetes mellitus type 2, last A1c 10 6, continue management per primary team  Obstructive sleep apnea on BiPAP night    Discussed with dr Stanford Ram         SUBJECTIVE:   No new complaints  No shortness of breath  No chest pain  OBJECTIVE:  Current Weight: Weight - Scale: 135 kg (297 lb 6 4 oz)  Vitals:    09/23/20 1118   BP: 112/68   Pulse: 76   Resp:    Temp:    SpO2: 97%       Intake/Output Summary (Last 24 hours) at 9/23/2020 1209  Last data filed at 9/23/2020 0730  Gross per 24 hour   Intake 1584 98 ml   Output    Net 1584 98 ml       Physical Exam  General:  Ill looking, awake  Obese   Eyes: Conjunctivae pink,  Sclera anicteric  ENT: lips and mucous membranes moist  Neck: supple   Chest: Clear to Auscultation both lungs,  no crackles, ronchus or wheezing  CVS: S1 & S2 present, normal rate, regular rhythm, no murmur    Abdomen: soft, non-tender, non-distended, Bowel sounds normoactive  Extremities: trace edema of  legs  Skin: no rash  Neuro: awake, alert, oriented x 3   Psych: Mood and affect appropriate       Medications:    Current Facility-Administered Medications:     acetaminophen (TYLENOL) tablet 650 mg, 650 mg, Oral, Q4H PRN, Riana Austin MD, 650 mg at 09/23/20 1121    al mag oxide-diphenhydramine-lidocaine viscous (MAGIC MOUTHWASH) suspension 10 mL, 10 mL, Swish & Swallow, Q4H PRN, Riana Austin MD, 10 mL at 09/22/20 2240    AMILoride tablet 5 mg, 5 mg, Oral, BID, Riana Austin MD, 5 mg at 09/23/20 1117    amiodarone tablet 200 mg, 200 mg, Oral, BID With Meals, Riana Austin MD, 200 mg at 09/23/20 0841    aspirin chewable tablet 81 mg, 81 mg, Oral, Daily, Riana Austin MD, 81 mg at 09/23/20 0841    atorvastatin (LIPITOR) tablet 80 mg, 80 mg, Oral, Daily With Keagan Cruz MD, 80 mg at 09/22/20 1630    ferrous sulfate tablet 325 mg, 325 mg, Oral, Every Other Day, Riana Austin MD, 325 mg at 09/23/20 0841    fish oil capsule 1,000 mg, 1,000 mg, Oral, Daily, Riana Austin MD, 1,000 mg at 09/23/20 0841    fluticasone (FLONASE) 50 mcg/act nasal spray 2 spray, 2 spray, Each Nare, Daily, Felipa Mosquera MD, 2 spray at 09/23/20 1119    gabapentin (NEURONTIN) capsule 600 mg, 600 mg, Oral, TID, Felipa Mosquera MD, 600 mg at 09/23/20 0841    insulin glargine (LANTUS) subcutaneous injection 30 Units 0 3 mL, 30 Units, Subcutaneous, Q12H [address], Felipa Mosquera MD, 30 Units at 09/23/20 0855    insulin lispro (HumaLOG) 100 units/mL subcutaneous injection 2-12 Units, 2-12 Units, Subcutaneous, TID AC, 2 Units at 09/21/20 1618 **AND** Fingerstick Glucose (POCT), , , TID AC, Felipa Mosquera MD    insulin lispro (HumaLOG) 100 units/mL subcutaneous injection 20 Units, 20 Units, Subcutaneous, TID With Meals, Felipa Mosquera MD, 20 Units at 09/23/20 1116    isosorbide mononitrate (IMDUR) 24 hr tablet 30 mg, 30 mg, Oral, Daily, Felipa Mosquera MD    lidocaine (LIDODERM) 5 % patch 2 patch, 2 patch, Topical, Daily, Felipa Mosquera MD, 2 patch at 09/23/20 0841    LORazepam (ATIVAN) tablet 0 5 mg, 0 5 mg, Oral, HS, Felipa Mosquera MD, 0 5 mg at 09/22/20 2120    melatonin tablet 6 mg, 6 mg, Oral, HS, Felipa Mosquera MD, 6 mg at 09/22/20 2120    methocarbamol (ROBAXIN) tablet 750 mg, 750 mg, Oral, Q6H PRN, Angela Reyes,     metoprolol tartrate (LOPRESSOR) partial tablet 12 5 mg, 12 5 mg, Oral, Q12H [address], Felipa Mosquera MD, 12 5 mg at 09/22/20 2120    oxyCODONE (ROXICODONE) IR tablet 2 5 mg, 2 5 mg, Oral, Q4H PRN **OR** oxyCODONE (ROXICODONE) IR tablet 5 mg, 5 mg, Oral, Q4H PRN, Felipa Mosquera MD, 5 mg at 09/23/20 0840    pantoprazole (PROTONIX) EC tablet 40 mg, 40 mg, Oral, Early Morning, Felipa Mosquera MD, 40 mg at 09/23/20 0500    polyethylene glycol (MIRALAX) packet 17 g, 17 g, Oral, Daily, Felipa Mosquera MD, 17 g at 09/23/20 0839    potassium chloride (K-DUR,KLOR-CON) CR tablet 20 mEq, 20 mEq, Oral, BID, Felipa Mosquera MD, 20 mEq at 09/23/20 0841    rivaroxaban (Jimmy Garcia) tablet 20 mg, 20 mg, Oral, Daily With Hu Arredondo MD, 20 mg at 09/22/20 1630    senna (SENOKOT) tablet 8 6 mg, 1 tablet, Oral, HS PRN, Adalid Gardner MD    Invasive Devices:        Lab Results:   Results from last 7 days   Lab Units 09/23/20  0559 09/22/20  1640 09/22/20  0508  09/21/20  1402 09/21/20  0427 09/21/20  0006 09/20/20  2357   WBC Thousand/uL 6 51  --  9 04  --   --  12 39*  --  15 34*   HEMOGLOBIN g/dL 12 7  --  12 1  --   --  11 8*  --  12 8   I STAT HEMOGLOBIN g/dl  --   --   --   --   --   --  13 9  --    HEMATOCRIT % 42 0  --  38 8  --   --  38 6  --  40 9   HEMATOCRIT, ISTAT %  --   --   --   --   --   --  41  --    PLATELETS Thousands/uL 262  --  238  --   --  259  --  343   POTASSIUM mmol/L 4 3 4 4 3 8   < > 3 0* 2 7*  --  1 8*   CHLORIDE mmol/L 110* 108 108   < > 100 96*  --  94*   CO2 mmol/L 26 29 31   < > 29 32  --  28   CO2, I-STAT mmol/L  --   --   --   --   --   --  32  --    BUN mg/dL 17 18 18   < > 20 23  --  28*   CREATININE mg/dL 1 50* 1 78* 1 56*   < > 1 54* 1 75*  --  2 15*   CALCIUM mg/dL 8 8 8 7 8 6   < > 8 3 8 2*  --  8 6   MAGNESIUM mg/dL  --   --   --   --   --  2 6  --  1 8   PHOSPHORUS mg/dL  --   --   --   --   --  3 4  --  1 7*   ALK PHOS U/L  --   --   --   --  135* 142*  --  148*   ALT U/L  --   --   --   --  26 29  --  28   AST U/L  --   --   --   --  30 28  --  23   GLUCOSE, ISTAT mg/dl  --   --   --   --   --   --  167*  --     < > = values in this interval not displayed  Previous work up:   CHEST      INDICATION:   vtach      COMPARISON:  4/26/2014     EXAM PERFORMED/VIEWS:  XR CHEST PORTABLE        FINDINGS:  Unipolar AICD is unchanged in position  External pacemaker pad is also identified      Cardiomegaly is noted      Mild pulmonary vascular congestion is new  There is hypoventilation also noted  No focal infiltrate is identified      Surgical clips are noted at the base of the neck    PICC catheter is noted with tip in SVC      Osseous structures appear within normal limits for patient age      IMPRESSION:     Cardiomegaly with mild pulmonary vascular congestion  Portions of the record may have been created with voice recognition software  Occasional wrong word or "sound a like" substitutions may have occurred due to the inherent limitations of voice recognition software  Read the chart carefully and recognize, using context, where substitutions have occurred  If you have any questions, please contact the dictating provider

## 2020-09-23 NOTE — ASSESSMENT & PLAN NOTE
V-tach stom and ICD shocks likely multifactorial in the setting of diuretics, hypokalemia and Ctuximab for squamous cell carcinoma of the tongue  EP is following with daily interrogation  No further episode on tele  On oral amiodarone and Lopressor  Potassium replacement  Follow on EP recommendation

## 2020-09-23 NOTE — PLAN OF CARE
Problem: Prexisting or High Potential for Compromised Skin Integrity  Goal: Skin integrity is maintained or improved  Description: INTERVENTIONS:  - Identify patients at risk for skin breakdown  - Assess and monitor skin integrity  - Assess and monitor nutrition and hydration status  - Monitor labs   - Assess for incontinence   - Turn and reposition patient  - Assist with mobility/ambulation  - Relieve pressure over bony prominences  - Avoid friction and shearing  - Provide appropriate hygiene as needed including keeping skin clean and dry  - Evaluate need for skin moisturizer/barrier cream  - Collaborate with interdisciplinary team   - Patient/family teaching  - Consider wound care consult   Outcome: Progressing     Problem: Potential for Falls  Goal: Patient will remain free of falls  Description: INTERVENTIONS:  - Assess patient frequently for physical needs  -  Identify cognitive and physical deficits and behaviors that affect risk of falls    -  Stitzer fall precautions as indicated by assessment   - Educate patient/family on patient safety including physical limitations  - Instruct patient to call for assistance with activity based on assessment  - Modify environment to reduce risk of injury  - Consider OT/PT consult to assist with strengthening/mobility  Outcome: Progressing     Problem: PAIN - ADULT  Goal: Verbalizes/displays adequate comfort level or baseline comfort level  Description: Interventions:  - Encourage patient to monitor pain and request assistance  - Assess pain using appropriate pain scale  - Administer analgesics based on type and severity of pain and evaluate response  - Implement non-pharmacological measures as appropriate and evaluate response  - Consider cultural and social influences on pain and pain management  - Notify physician/advanced practitioner if interventions unsuccessful or patient reports new pain  Outcome: Progressing     Problem: INFECTION - ADULT  Goal: Absence or prevention of progression during hospitalization  Description: INTERVENTIONS:  - Assess and monitor for signs and symptoms of infection  - Monitor lab/diagnostic results  - Monitor all insertion sites, i e  indwelling lines, tubes, and drains  - Administer medications as ordered  - Instruct and encourage patient and family to use good hand hygiene technique  - Identify and instruct in appropriate isolation precautions for identified infection/condition  Outcome: Progressing     Problem: SAFETY ADULT  Goal: Patient will remain free of falls  Description: INTERVENTIONS:  - Assess patient frequently for physical needs  -  Identify cognitive and physical deficits and behaviors that affect risk of falls  -  Calvin fall precautions as indicated by assessment   - Educate patient/family on patient safety including physical limitations  - Instruct patient to call for assistance with activity based on assessment  - Modify environment to reduce risk of injury  - Consider OT/PT consult to assist with strengthening/mobility  Outcome: Progressing     Problem: Knowledge Deficit  Goal: Patient/family/caregiver demonstrates understanding of disease process, treatment plan, medications, and discharge instructions  Description: Complete learning assessment and assess knowledge base    Interventions:  - Provide teaching at level of understanding  - Provide teaching via preferred learning methods  Outcome: Progressing     Problem: CARDIOVASCULAR - ADULT  Goal: Maintains optimal cardiac output and hemodynamic stability  Description: INTERVENTIONS:  - Monitor I/O, vital signs and rhythm  - Monitor for S/S and trends of decreased cardiac output  - Administer and titrate ordered vasoactive medications to optimize hemodynamic stability  - Assess quality of pulses, skin color and temperature  - Assess for signs of decreased coronary artery perfusion  - Instruct patient to report change in severity of symptoms  Outcome: Progressing  Goal: Absence of cardiac dysrhythmias or at baseline rhythm  Description: INTERVENTIONS:  - Continuous cardiac monitoring, vital signs, obtain 12 lead EKG if ordered  - Administer antiarrhythmic and heart rate control medications as ordered  - Monitor electrolytes and administer replacement therapy as ordered  Outcome: Progressing

## 2020-09-23 NOTE — PROGRESS NOTES
Progress Note - Camila Oliver 1960, 62 y o  male MRN: [id number]    [facility] Encounter: [id number]    Primary Care Provider: No primary care provider on file  Date and time admitted to hospital: 9/21/2020  4:00 AM        * Ventricular tachycardia ([address] )  Assessment & Plan  V-tach stom and ICD shocks likely multifactorial in the setting of diuretics, hypokalemia and Ctuximab for squamous cell carcinoma of the tongue  EP is following with daily interrogation  No further episode on tele  On oral amiodarone and Lopressor  Potassium replacement  Follow on EP recommendation      Hypokalemia  Assessment & Plan  Resolved  Continue amiloride for potassium-sparing  Continue potassium supplement    Type 2 diabetes mellitus, with long-term current use of insulin [facility])  Assessment & Plan  Lab Results   Component Value Date    HGBA1C 10 6 (H) 09/10/2020       Recent Labs     09/22/20  2109 09/22/20  2149 09/23/20  0639 09/23/20  1047   POCGLU 68 99 122 116       Blood Sugar Average: Last 72 hrs:  (P) 784 [id number]     Poorly-controlled diabetes  Continue current Lantus and Humalog t i d  With meals  Continue insulin sliding scale    Paroxysmal A-fib ([facility])  Assessment & Plan  Continue Xarelto    Cardiomyopathy [facility])  Assessment & Plan  Underlying CAD with EF 40-45%  Per Cardiology combination of mixed ischemic/nonischemic cardiomyopathy  Continue aspirin, statin, Imdur and Lopressor    Obstructive sleep apnea  Assessment & Plan  Continue BiPAP q h s      CHF (congestive heart failure) ([facility])  Assessment & Plan  Wt Readings from Last 3 Encounters:   09/23/20 135 kg (297 lb 6 4 oz)   05/02/14 (!) 153 kg (336 lb 15 9 oz)       Chronic combined CHF EF 40-45%  OP regimen: torsemide 60mg BID, metolazone 2 5mg MWF (with prn increase to 5mg when he though he was overloaded), K 20mEq BID; not on BB  Heart failure is following    Stage 3 chronic kidney disease [facility])  Assessment & Plan  Stable  Nephrology is following         VTE Pharmacologic Prophylaxis:   Pharmacologic: Rivaroxaban (Xarelto)  Mechanical VTE Prophylaxis in Place: Yes    Patient Centered Rounds: I have performed bedside rounds with nursing staff today  Discussions with Specialists or Other Care Team Provider: EP    Education and Discussions with Family / Patient:  Patient    Time Spent for Care: 45 minutes  More than 50% of total time spent on counseling and coordination of care as described above  Current Length of Stay: 2 day(s)    Current Patient Status: Inpatient   Certification Statement: The patient will continue to require additional inpatient hospital stay due to Above    Discharge Plan / Estimated Discharge Date: TBD    Code Status: Level 1 - Full Code      Subjective:   Patient seen and examined  Comfortable in bed  Still with anterior chest wall pain from the shock  No nausea vomiting or diarrhea  The chart was reviewed    Objective:     Vitals:   Temp (24hrs), Av °F (36 7 °C), Min:97 5 °F (36 4 °C), Max:98 3 °F (36 8 °C)    Temp:  [97 5 °F (36 4 °C)-98 3 °F (36 8 °C)] 97 5 °F (36 4 °C)  HR:  [66-76] 76  Resp:  [18-20] 18  BP: ()/(42-85) 112/68  SpO2:  [94 %-98 %] 97 %  Body mass index is 42 67 kg/m²  Input and Output Summary (last 24 hours):        Intake/Output Summary (Last 24 hours) at 2020 1213  Last data filed at 2020 0730  Gross per 24 hour   Intake 1584 98 ml   Output    Net 1584 98 ml       Physical Exam:     Physical Exam     Patient is awake alert oriented no acute distress  Obese  Lung clear to auscultation bilateral  Heart positive S1-S2 no murmur  Abdomen soft obese nontender  Lower extremities no edema    Additional Data:     Labs:    Results from last 7 days   Lab Units 20  0559  20  0427   WBC Thousand/uL 6 51   < > 12 39*   HEMOGLOBIN g/dL 12 7   < > 11 8*   HEMATOCRIT % 42 0   < > 38 6   PLATELETS Thousands/uL 262   < > 259   NEUTROS PCT %  --   --  92*   LYMPHS PCT %  --   --  3* MONOS PCT %  --   --  5   EOS PCT %  --   --  0    < > = values in this interval not displayed  Results from last 7 days   Lab Units 09/23/20  0559  09/21/20  1402  09/21/20  0006   POTASSIUM mmol/L 4 3   < > 3 0*   < >  --    CHLORIDE mmol/L 110*   < > 100   < >  --    CO2 mmol/L 26   < > 29   < >  --    CO2, I-STAT mmol/L  --   --   --   --  32   BUN mg/dL 17   < > 20   < >  --    CREATININE mg/dL 1 50*   < > 1 54*   < >  --    CALCIUM mg/dL 8 8   < > 8 3   < >  --    ALK PHOS U/L  --   --  135*   < >  --    ALT U/L  --   --  26   < >  --    AST U/L  --   --  30   < >  --    GLUCOSE, ISTAT mg/dl  --   --   --   --  167*    < > = values in this interval not displayed  Results from last 7 days   Lab Units 09/20/20  2357   INR  1 97*       * I Have Reviewed All Lab Data Listed Above  * Additional Pertinent Lab Tests Reviewed: [facility] 67 Admission Reviewed    Imaging:    Imaging Reports Reviewed Today Include:   Imaging Personally Reviewed by Myself Includes:     Recent Cultures (last 7 days):     Results from last 7 days   Lab Units 09/23/20  0559 09/23/20  0507   BLOOD CULTURE  Received in Microbiology Lab  Culture in Progress  Received in Microbiology Lab  Culture in Progress         Last 24 Hours Medication List:   Current Facility-Administered Medications   Medication Dose Route Frequency Provider Last Rate    acetaminophen  650 mg Oral Q4H PRN Brendan Helton MD      al mag oxide-diphenhydramine-lidocaine viscous  10 mL Swish & Swallow Q4H PRN Brendan Helton MD      AMILoride  5 mg Oral BID Brendan Helton MD      amiodarone  200 mg Oral BID With Meals Brendan Helton MD      aspirin  81 mg Oral Daily Brendan Helton MD      atorvastatin  80 mg Oral Daily With Asemere Sanchez MD      ferrous sulfate  325 mg Oral Every Other Day Brendan Helton MD      fish oil  1,000 mg Oral Daily Brendan Helton MD      fluticasone  2 spray Each Nare Daily Ari Haley Shaji Leos MD      gabapentin  600 mg Oral TID Tres Dickerson MD      insulin glargine  30 Units Subcutaneous Q12H [address] Tres Dickerson MD      insulin lispro  2-12 Units Subcutaneous TID AC Tres Dickerson MD      insulin lispro  20 Units Subcutaneous TID With Meals Tres Dickerson MD      isosorbide mononitrate  30 mg Oral Daily Tres Dickerson MD      lidocaine  2 patch Topical Daily Tres Dickerson MD      LORazepam  0 5 mg Oral HS Tres Dickerson, MD      melatonin  6 mg Oral HS Tres Dickerson, MD      methocarbamol  750 mg Oral Q6H PRN Celso Valderrama DO      metoprolol tartrate  12 5 mg Oral Q12H [address] Tres Dickerson MD      oxyCODONE  2 5 mg Oral Q4H PRN MD Tanmay Ding oxyCODONE  5 mg Oral Q4H PRN Tres Dickerson, MD      pantoprazole  40 mg Oral Early Morning Tres Dickerson MD      polyethylene glycol  17 g Oral Daily Tres Dickerson MD      potassium chloride  20 mEq Oral BID Tres Dickerson MD      rivaroxaban  20 mg Oral Daily With Brii Mendez MD      senna  1 tablet Oral HS PRN Tres Dickerson MD          Today, Patient Was Seen By: Celso Valderrama DO    ** Please Note: This note has been constructed using a voice recognition system   **

## 2020-09-23 NOTE — ASSESSMENT & PLAN NOTE
Wt Readings from Last 3 Encounters:   09/23/20 135 kg (297 lb 6 4 oz)   05/02/14 (!) 153 kg (336 lb 15 9 oz)       Chronic combined CHF EF 40-45%  OP regimen: torsemide 60mg BID, metolazone 2 5mg MWF (with prn increase to 5mg when he though he was overloaded), K 20mEq BID; not on BB  Heart failure is following

## 2020-09-23 NOTE — MALNUTRITION/BMI
This medical record reflects one or more clinical indicators suggestive of  morbid obesity  BMI Findings:  BMI Classifications: Morbid Obesity 40-44 9     Body mass index is 42 67 kg/m²  See Nutrition note dated 09/23/2020   for additional details  Completed nutrition assessment is viewable in the nutrition documentation

## 2020-09-23 NOTE — PROGRESS NOTES
Electrophysiology Progress Note - Jazmin Barnett 62 y o  male MRN: [id number]    Unit/Bed#: [facility] 727-01 Encounter: [id number]          Subjective:   Patient seen and examined  No significant telemetry events overnight  He continues to endorse mid chest wall soreness but otherwise feels well  Objective:     Vitals: Blood pressure 112/68, pulse 76, temperature 97 5 °F (36 4 °C), temperature source Oral, resp  rate 18, height 5' 10" (1 778 m), weight 135 kg (297 lb 6 4 oz), SpO2 97 %  , Body mass index is 42 67 kg/m² ,   Orthostatic Blood Pressures      Most Recent Value   Blood Pressure  112/68 filed at 09/23/2020 1118   Patient Position - Orthostatic VS  Lying filed at 09/23/2020 0850            Intake/Output Summary (Last 24 hours) at 9/23/2020 1232  Last data filed at 9/23/2020 0730  Gross per 24 hour   Intake 1584 98 ml   Output    Net 1584 98 ml         Physical Exam:    GEN: Jazmin Barnett appears well, alert and oriented x 3, pleasant and cooperative   HEENT:  Normocephalic, atraumatic, anicteric, moist mucous membranes  NECK: No JVD or carotid bruits   HEART: Regular rhythm, normal rate, normal S1 and S2, no murmurs, clicks, gallops or rubs; chest wall tender to palpation   LUNGS: Clear to auscultation bilaterally; no wheezes, rales, or rhonchi; respiration nonlabored   ABDOMEN:  Normoactive bowel sounds, soft, no tenderness, no distention; protuberant  EXTREMITIES: peripheral pulses palpable; no edema  NEURO: no gross focal findings; cranial nerves grossly intact   SKIN:  Dry, intact, warm to touch    Current Facility-Administered Medications:     acetaminophen (TYLENOL) tablet 650 mg, 650 mg, Oral, Q4H PRN, Jefe Cordero MD, 650 mg at 09/23/20 1121    al mag oxide-diphenhydramine-lidocaine viscous (MAGIC MOUTHWASH) suspension 10 mL, 10 mL, Swish & Swallow, Q4H PRN, Jefe Cordero MD, 10 mL at 09/22/20 2240    AMILoride tablet 5 mg, 5 mg, Oral, BID, Jefe Cordero MD, 5 mg at 09/23/20 1117    amiodarone tablet 200 mg, 200 mg, Oral, BID With Meals, Robert Horn MD, 200 mg at 09/23/20 0841    aspirin chewable tablet 81 mg, 81 mg, Oral, Daily, Robert Horn MD, 81 mg at 09/23/20 0841    atorvastatin (LIPITOR) tablet 80 mg, 80 mg, Oral, Daily With Tripp Clark MD, 80 mg at 09/22/20 1630    ferrous sulfate tablet 325 mg, 325 mg, Oral, Every Other Day, Robert Horn MD, 325 mg at 09/23/20 0841    fish oil capsule 1,000 mg, 1,000 mg, Oral, Daily, Robert Horn MD, 1,000 mg at 09/23/20 0841    fluticasone (FLONASE) 50 mcg/act nasal spray 2 spray, 2 spray, Each Nare, Daily, Robert Horn MD, 2 spray at 09/23/20 1119    gabapentin (NEURONTIN) capsule 600 mg, 600 mg, Oral, TID, Robert Horn MD, 600 mg at 09/23/20 0841    insulin glargine (LANTUS) subcutaneous injection 30 Units 0 3 mL, 30 Units, Subcutaneous, Q12H [address], Robert Horn MD, 30 Units at 09/23/20 0855    insulin lispro (HumaLOG) 100 units/mL subcutaneous injection 2-12 Units, 2-12 Units, Subcutaneous, TID AC, 2 Units at 09/21/20 1618 **AND** Fingerstick Glucose (POCT), , , TID AC, Robert Horn MD    insulin lispro (HumaLOG) 100 units/mL subcutaneous injection 20 Units, 20 Units, Subcutaneous, TID With Meals, Robert Horn MD, 20 Units at 09/23/20 1116    isosorbide mononitrate (IMDUR) 24 hr tablet 30 mg, 30 mg, Oral, Daily, Robert Horn MD    lidocaine (LIDODERM) 5 % patch 2 patch, 2 patch, Topical, Daily, Robert Horn MD, 2 patch at 09/23/20 0841    LORazepam (ATIVAN) tablet 0 5 mg, 0 5 mg, Oral, HS, Robert Horn MD, 0 5 mg at 09/22/20 2120    melatonin tablet 6 mg, 6 mg, Oral, HS, Robert Horn MD, 6 mg at 09/22/20 2120    methocarbamol (ROBAXIN) tablet 750 mg, 750 mg, Oral, Q6H PRN, Jhon Repress, DO    metoprolol tartrate (LOPRESSOR) partial tablet 12 5 mg, 12 5 mg, Oral, Q12H [address], Robert Horn MD, 12 5 mg at 09/22/20 2120    oxyCODONE (ROXICODONE) IR tablet 2 5 mg, 2 5 mg, Oral, Q4H PRN **OR** oxyCODONE (ROXICODONE) IR tablet 5 mg, 5 mg, Oral, Q4H PRN, Arline Colvin MD, 5 mg at 09/23/20 0840    pantoprazole (PROTONIX) EC tablet 40 mg, 40 mg, Oral, Early Morning, Arline Colvin MD, 40 mg at 09/23/20 0500    polyethylene glycol (MIRALAX) packet 17 g, 17 g, Oral, Daily, Arline Colvin MD, 17 g at 09/23/20 0839    potassium chloride (K-DUR,KLOR-CON) CR tablet 20 mEq, 20 mEq, Oral, BID, Arline Colvin MD, 20 mEq at 09/23/20 0841    rivaroxaban (XARELTO) tablet 20 mg, 20 mg, Oral, Daily With Jus Perez MD, 20 mg at 09/22/20 1630    senna (SENOKOT) tablet 8 6 mg, 1 tablet, Oral, HS PRN, Arline Colvin MD    Labs & Results:    Lab Results   Component Value Date    CKTOTAL 160 02/24/2014    CKTOTAL 130 02/24/2014    CKTOTAL 148 02/23/2014    TROPONINI 2 07 (H) 09/21/2020    TROPONINI 2 49 (H) 09/21/2020    TROPONINI 2 38 (H) 09/21/2020       Lab Results   Component Value Date    GLUCOSE 167 (H) 09/21/2020    CALCIUM 8 8 09/23/2020     04/26/2014    K 4 3 09/23/2020    CO2 26 09/23/2020     (H) 09/23/2020    BUN 17 09/23/2020    CREATININE 1 50 (H) 09/23/2020       Lab Results   Component Value Date    WBC 6 51 09/23/2020    HGB 12 7 09/23/2020    HCT 42 0 09/23/2020    MCV 84 09/23/2020     09/23/2020     Results from last 7 days   Lab Units 09/20/20  2357   INR  1 97*       No results found for: CHOL  No results found for: HDL  No results found for: LDLCALC  No results found for: TRIG    Lab Results   Component Value Date    ALT 26 09/21/2020    AST 30 09/21/2020         Telemetry:   Personally reviewed by Libertad Simmons MD:  Sinus rhythm with heart rate ranging 60 to 80s with rare ventricular ectopy    Imaging:  No new cardiac images to review      VTE Prophylaxis: Rivaroxaban      Assessment:  Principal Problem:    Ventricular tachycardia ([address] )  Active Problems:    Hypokalemia    CAD (coronary artery disease) Stage 3 chronic kidney disease (HCC)    CHF (congestive heart failure) ([facility])    Elevated troponin    Obstructive sleep apnea    Cardiomyopathy ([facility] )    Paroxysmal A-fib ([facility] )    Type 2 diabetes mellitus, with long-term current use of insulin ([facility] )      1  Ventricular fibrillation store s/p 55 ICD shocks  -multifactorial etiology:  metolazone, torsemide, cetuximab and to lesser extent insulin  -[employer] Visia SC-ICD initially implanted 04/2009 with generator upgrade to MRI conditional device 11/2017  -R on T phenomenon preceding each episode  -amiodarone drip; lidocaine discontinued  -K 1 8 -> 4 3 s/p 400 mEq of KCl  -no further recurrence on telemetry at [facility]  -battery life documented 7 4 years as of June, down to 16 months on 9/21 and 9/22 interrogations  -9/23 device interrogation:  11 years battery life w/ pre arrhythmia EGM turned off     2  Mixed ischemic/nonischemic cardiomyopathy  -known CAD, alcohol use, JONNA on CPAP  -4/2019 Lexiscan:  Mid inferior/inferolateral infarct, gated EF 34%, dilated and inferior akinesis  -09/2019 TTE:  LVEF 50%, LVIDd 6 4 cm, normal RV size/function, LA 4 7 cm; no significant valvulopathy  -CAD: Aspirin, atorvastatin, isosorbide mononitrate  -CHF: amiloride; home torsemide held, not on beta-blockers or Ace as per last [facility]  -presenting NT-proBNP 614, can be falsely low due to obesity     3  Paroxysmal atrial fibrillation  -currently in sinus mechanism  -RF: JONNA, previous alcohol use, obesity  -[id number] 4, HAS-BLED 2  -LA 4 7 cm  -rivaroxaban, amiodarone infusion      Plan:  1  Patient's interrogation showed further decreasing battery life from 16 to 11 months this morning after turning of an energy saving feature the day prior  Based on that information, patient warrants further interrogations over the next 2 days  We will turn on all device function to assess daily usage and life span remaining      2  If battery life decreased further, there would be indication for inpatient generator change as any further VF storm could lead to a fatal event  3  This was explained to patient and spouse who expressed understanding and are agreeable with the plan as highlighted above  Case discussed and reviewed with Dr Sergio Landry who agrees with my assessment and plan  Thank you for involving us in the care of your patient  Epic/ Allscripts/Care Everywhere records reviewed:  Yes    ** Please Note: Fluency DirectDictation voice to text software may have been used in the creation of this document   **

## 2020-09-23 NOTE — PROGRESS NOTES
Heart Failure - Progress Note  Matthew Cam 62 y o  male MRN: [id number]  Unit/Bed#: [facility] 727-01 Encounter: [id number]    Assessment:  Principal Problem:    Ventricular tachycardia (HCC)  Active Problems:    Hypokalemia    CAD (coronary artery disease)    CKD (chronic kidney disease)    CHF (congestive heart failure) (HCC)    Elevated troponin    Obstructive sleep apnea    # VT Storm/VF cardiac arrest 2/2 R-on-T from severe hypokalemia  -etiology of hypokalemia likely diuretic (predemontantly metolazone) therapy; cetuximab also has a predilection to cause hypokalemia  -no recurrence of VT/VF; now on PO amio  -hypokalemia resolved with holding diuretic, supplementing K  # Chronic ischemic HFrEF 45-50%; FC II-III; Stage C  -likely some degree of volume overload present  -TTE [facility] 09/14/20 per report: LVEF 40-45%, LVEDd 6 7 cm, DD1, normal RV, severe LAE  -OP regimen: torsemide 60mg BID, metolazone 2 5mg MWF (with prn increase to 5mg when he though he was overloaded), K 20mEq BID; not on BB per EMR due to hypotension with Coreg?  -current regimen: Imdur 30mg, amiloride 5mg BID, metorpolol tartrate 12 5mg BID  -a consideration for switching amiloride to eplerenone can be made as OP as it has better data in the setting of HFrEF (patient did mention atypical "allergic" response to spironolactone)  -reported dry weight ~280-285lb; current weight 297lb  # CAD  -hx of  RCA per EMR  -[facility] 04/2019 [facility] Report: no ischemia, large infarct of the inferior/inferolateral walls, LVEF 34%  -OP regimen:  ASA, Imdur 120mg, atorva 80mg  # PAF on Xarelto; not on AVB  # HTN  # HLD  # JONNA on BPAP  # Obesity  # SCC of tongue on cetuximab/RT  # FORD on CKD; resolved  -baseline Cr ~1 7  # Strep spp  Bacteremia at [facility] on 9/8 admission  -unclear if contaminant; on IV abx; repeat cx pending        Plan:  1  Continue amiloride for potassium sparing   As K has now normalized, restart home diuretic torsemide 60mg BID this afternoon with close monitoring of potassium (repeat BMP tonight)  Schedule KDur 20 mEq BID to start with and optimize dose based on K levels  2  Change BB, nitrate BP parameters so medications are administered as patient will continue to have a chronically low BP  Plan to change metoprolol to succinate 12 5 or 25mg daily on discharge  3  Management of ICD battery per EP  Patient will stay for further device interrogations to determine timing of generator change  4  Will follow with you  Subjective/Objective     Subjective:No events overnight  Mild chest discomfort unchanged  Denies dizziness, dyspnea, orthopnea, PND        Objective:  Vitals: BP (!) 94/43 (BP Location: Left arm)   Pulse 76   Temp 97 5 °F (36 4 °C) (Oral)   Resp 18   Ht 5' 10" (1 778 m)   Wt 135 kg (297 lb 6 4 oz)   SpO2 96%   BMI 42 67 kg/m²   Vitals:    09/22/20 1317 09/23/20 0600   Weight: 135 kg (297 lb 6 4 oz) 135 kg (297 lb 6 4 oz)     Orthostatic Blood Pressures      Most Recent Value   Blood Pressure  (!) 94/43 filed at 09/23/2020 0850   Patient Position - Orthostatic VS  Lying filed at 09/23/2020 0850            Intake/Output Summary (Last 24 hours) at 9/23/2020 1295  Last data filed at 9/23/2020 0730  Gross per 24 hour   Intake 1651 58 ml   Output    Net 1651 58 ml       Physical Exam:   General appearance: alert and in no acute distress  Head: Normocephalic, without obvious abnormality, atraumatic  Neck: unable to appreciate neck veins due to body habitus; supple, symmetrical, trachea midline  Lungs: clear to auscultation bilaterally, Normal effort, No rales, wheezing  Heart: S1, S2 normal and no S3 or S4, No murmur, No gallop, No rub  Abdomen: soft, non-tender; no masses,  no organomegaly  Extremities: extremities normal, atraumatic, no cyanosis, no significant pitting edema  Pulses: 2+ and symmetric bilaterally  Skin: Skin color, texture, turgor normal; No rashes or lesions  Neurologic: Grossly normal, Alert and oriented      Medications:    Current Facility-Administered Medications:     acetaminophen (TYLENOL) tablet 650 mg, 650 mg, Oral, Q4H PRN, Dany Martinez MD, 650 mg at 09/23/20 0235    al mag oxide-diphenhydramine-lidocaine viscous (MAGIC MOUTHWASH) suspension 10 mL, 10 mL, Swish & Swallow, Q4H PRN, Dany Martinez MD, 10 mL at 09/22/20 2240    AMILoride tablet 5 mg, 5 mg, Oral, BID, Dany Martinez MD, 5 mg at 09/22/20 2122    amiodarone tablet 200 mg, 200 mg, Oral, BID With Meals, Dany Martinez MD, 200 mg at 09/23/20 0841    aspirin chewable tablet 81 mg, 81 mg, Oral, Daily, Dany Martinez MD, 81 mg at 09/23/20 0841    atorvastatin (LIPITOR) tablet 80 mg, 80 mg, Oral, Daily With Tian Baker MD, 80 mg at 09/22/20 1630    ferrous sulfate tablet 325 mg, 325 mg, Oral, Every Other Day, Dany Martinez MD, 325 mg at 09/23/20 0841    fish oil capsule 1,000 mg, 1,000 mg, Oral, Daily, Dany Martinez MD, 1,000 mg at 09/23/20 0841    fluticasone (FLONASE) 50 mcg/act nasal spray 2 spray, 2 spray, Each Nare, Daily, Dany Martinez MD, 2 spray at 09/22/20 0811    gabapentin (NEURONTIN) capsule 600 mg, 600 mg, Oral, TID, Dany Martinez MD, 600 mg at 09/23/20 0841    insulin glargine (LANTUS) subcutaneous injection 30 Units 0 3 mL, 30 Units, Subcutaneous, Q12H [facility], Dany Martinez MD, 30 Units at 09/23/20 0855    insulin lispro (HumaLOG) 100 units/mL subcutaneous injection 2-12 Units, 2-12 Units, Subcutaneous, TID AC, 2 Units at 09/21/20 1618 **AND** Fingerstick Glucose (POCT), , , TID AC, Dany Martinez MD    insulin lispro (HumaLOG) 100 units/mL subcutaneous injection 20 Units, 20 Units, Subcutaneous, TID With Meals, Dany Martinez MD, 20 Units at 09/23/20 0742    isosorbide mononitrate (IMDUR) 24 hr tablet 30 mg, 30 mg, Oral, Daily, Dany Martinez MD    lidocaine (LIDODERM) 5 % patch 2 patch, 2 patch, Topical, Daily, Dany Martinez MD, 2 patch at 09/23/20 4744    LORazepam (ATIVAN) tablet 0 5 mg, 0 5 mg, Oral, HS, Zohra Burks MD, 0 5 mg at 09/22/20 2120    melatonin tablet 6 mg, 6 mg, Oral, HS, Zohra Burks MD, 6 mg at 09/22/20 2120    metoprolol tartrate (LOPRESSOR) partial tablet 12 5 mg, 12 5 mg, Oral, Q12H [address], Zohra Burks MD, 12 5 mg at 09/22/20 2120    oxyCODONE (ROXICODONE) IR tablet 2 5 mg, 2 5 mg, Oral, Q4H PRN **OR** oxyCODONE (ROXICODONE) IR tablet 5 mg, 5 mg, Oral, Q4H PRN, Zohra Burks MD, 5 mg at 09/23/20 0840    pantoprazole (PROTONIX) EC tablet 40 mg, 40 mg, Oral, Early Morning, Zohra Burks MD, 40 mg at 09/23/20 0500    polyethylene glycol (MIRALAX) packet 17 g, 17 g, Oral, Daily, Zohra Burks MD, 17 g at 09/23/20 0839    potassium chloride (K-DUR,KLOR-CON) CR tablet 20 mEq, 20 mEq, Oral, BID, Zohra Burks MD, 20 mEq at 09/23/20 0841    rivaroxaban (XARELTO) tablet 20 mg, 20 mg, Oral, Daily With Ramez Bryant MD, 20 mg at 09/22/20 1630    senna (SENOKOT) tablet 8 6 mg, 1 tablet, Oral, HS PRN, Zohra Burks MD    Lab Results:  Results from last 7 days   Lab Units 09/21/20  1618 09/21/20  1359 09/21/20  0941   TROPONIN I ng/mL 2 07* 2 49* 2 38*     Results from last 7 days   Lab Units 09/23/20  0559 09/22/20  0508 09/21/20  0427   WBC Thousand/uL 6 51 9 04 12 39*   HEMOGLOBIN g/dL 12 7 12 1 11 8*   HEMATOCRIT % 42 0 38 8 38 6   PLATELETS Thousands/uL 262 238 259         Results from last 7 days   Lab Units 09/23/20  0559 09/22/20  1640 09/22/20  0508  09/21/20  1402 09/21/20  0427 09/21/20  0006 09/20/20  2357   POTASSIUM mmol/L 4 3 4 4 3 8   < > 3 0* 2 7*  --  1 8*   CHLORIDE mmol/L 110* 108 108   < > 100 96*  --  94*   CO2 mmol/L 26 29 31   < > 29 32  --  28   CO2, I-STAT mmol/L  --   --   --   --   --   --  32  --    BUN mg/dL 17 18 18   < > 20 23  --  28*   CREATININE mg/dL 1 50* 1 78* 1 56*   < > 1 54* 1 75*  --  2 15*   CALCIUM mg/dL 8 8 8 7 8 6   < > 8 3 8 2*  --  8 6   ALK PHOS U/L --   --   --   --  135* 142*  --  148*   ALT U/L  --   --   --   --  26 29  --  28   AST U/L  --   --   --   --  30 28  --  23   GLUCOSE, ISTAT mg/dl  --   --   --   --   --   --  167*  --     < > = values in this interval not displayed  Results from last 7 days   Lab Units 09/20/20  2357   INR  1 97*   PTT seconds 34     Results from last 7 days   Lab Units 09/21/20  0427 09/20/20  2357   MAGNESIUM mg/dL 2 6 1 8       Telemetry: Personally reviewed  No events  Imaging: Personally reviewed  EKG: Personally reviewed       Sofi Allen MD  Cardiology Fellow

## 2020-09-24 LAB
ANION GAP SERPL CALCULATED.3IONS-SCNC: 5 MMOL/L (ref 4–13)
BUN SERPL-MCNC: 23 MG/DL (ref 5–25)
CALCIUM SERPL-MCNC: 8.8 MG/DL (ref 8.3–10.1)
CHLORIDE SERPL-SCNC: 103 MMOL/L (ref 100–108)
CO2 SERPL-SCNC: 28 MMOL/L (ref 21–32)
CREAT SERPL-MCNC: 1.61 MG/DL (ref 0.6–1.3)
GFR SERPL CREATININE-BSD FRML MDRD: 46 ML/MIN/1.73SQ M
GLUCOSE SERPL-MCNC: 110 MG/DL (ref 65–140)
GLUCOSE SERPL-MCNC: 126 MG/DL (ref 65–140)
GLUCOSE SERPL-MCNC: 129 MG/DL (ref 65–140)
GLUCOSE SERPL-MCNC: 134 MG/DL (ref 65–140)
GLUCOSE SERPL-MCNC: 192 MG/DL (ref 65–140)
GLUCOSE SERPL-MCNC: 70 MG/DL (ref 65–140)
GLUCOSE SERPL-MCNC: 90 MG/DL (ref 65–140)
MAGNESIUM SERPL-MCNC: 2.1 MG/DL (ref 1.6–2.6)
POTASSIUM SERPL-SCNC: 3.8 MMOL/L (ref 3.5–5.3)
SODIUM SERPL-SCNC: 136 MMOL/L (ref 136–145)

## 2020-09-24 PROCEDURE — 99232 SBSQ HOSP IP/OBS MODERATE 35: CPT | Performed by: INTERNAL MEDICINE

## 2020-09-24 PROCEDURE — 82948 REAGENT STRIP/BLOOD GLUCOSE: CPT

## 2020-09-24 PROCEDURE — 83735 ASSAY OF MAGNESIUM: CPT | Performed by: INTERNAL MEDICINE

## 2020-09-24 PROCEDURE — NC001 PR NO CHARGE: Performed by: INTERNAL MEDICINE

## 2020-09-24 PROCEDURE — 94660 CPAP INITIATION&MGMT: CPT

## 2020-09-24 PROCEDURE — 94760 N-INVAS EAR/PLS OXIMETRY 1: CPT

## 2020-09-24 PROCEDURE — 80048 BASIC METABOLIC PNL TOTAL CA: CPT | Performed by: INTERNAL MEDICINE

## 2020-09-24 RX ORDER — METHOCARBAMOL 500 MG/1
1000 TABLET, FILM COATED ORAL EVERY 8 HOURS SCHEDULED
Status: DISCONTINUED | OUTPATIENT
Start: 2020-09-24 | End: 2020-09-26 | Stop reason: HOSPADM

## 2020-09-24 RX ADMIN — METHOCARBAMOL TABLETS 750 MG: 750 TABLET, COATED ORAL at 02:28

## 2020-09-24 RX ADMIN — POTASSIUM CHLORIDE 20 MEQ: 1500 TABLET, EXTENDED RELEASE ORAL at 18:15

## 2020-09-24 RX ADMIN — AMIODARONE HYDROCHLORIDE 200 MG: 200 TABLET ORAL at 08:36

## 2020-09-24 RX ADMIN — RIVAROXABAN 20 MG: 20 TABLET, FILM COATED ORAL at 18:14

## 2020-09-24 RX ADMIN — Medication 12.5 MG: at 22:33

## 2020-09-24 RX ADMIN — LIDOCAINE HYDROCHLORIDE 10 ML: 20 SOLUTION ORAL; TOPICAL at 22:34

## 2020-09-24 RX ADMIN — LORAZEPAM 0.5 MG: 0.5 TABLET ORAL at 11:02

## 2020-09-24 RX ADMIN — TORSEMIDE 60 MG: 20 TABLET ORAL at 08:36

## 2020-09-24 RX ADMIN — GABAPENTIN 600 MG: 300 CAPSULE ORAL at 22:33

## 2020-09-24 RX ADMIN — INSULIN LISPRO 20 UNITS: 100 INJECTION, SOLUTION INTRAVENOUS; SUBCUTANEOUS at 18:20

## 2020-09-24 RX ADMIN — ATORVASTATIN CALCIUM 80 MG: 80 TABLET, FILM COATED ORAL at 18:15

## 2020-09-24 RX ADMIN — OXYCODONE HYDROCHLORIDE 5 MG: 5 TABLET ORAL at 15:05

## 2020-09-24 RX ADMIN — ASPIRIN 81 MG CHEWABLE TABLET 81 MG: 81 TABLET CHEWABLE at 08:36

## 2020-09-24 RX ADMIN — OXYCODONE HYDROCHLORIDE 5 MG: 5 TABLET ORAL at 19:05

## 2020-09-24 RX ADMIN — AMIODARONE HYDROCHLORIDE 200 MG: 200 TABLET ORAL at 18:14

## 2020-09-24 RX ADMIN — LORAZEPAM 0.5 MG: 0.5 TABLET ORAL at 19:05

## 2020-09-24 RX ADMIN — OXYCODONE HYDROCHLORIDE 5 MG: 5 TABLET ORAL at 02:28

## 2020-09-24 RX ADMIN — GABAPENTIN 600 MG: 300 CAPSULE ORAL at 08:36

## 2020-09-24 RX ADMIN — INSULIN LISPRO 20 UNITS: 100 INJECTION, SOLUTION INTRAVENOUS; SUBCUTANEOUS at 11:03

## 2020-09-24 RX ADMIN — INSULIN GLARGINE 30 UNITS: 100 INJECTION, SOLUTION SUBCUTANEOUS at 22:44

## 2020-09-24 RX ADMIN — LORAZEPAM 0.5 MG: 0.5 TABLET ORAL at 22:34

## 2020-09-24 RX ADMIN — TORSEMIDE 60 MG: 20 TABLET ORAL at 18:13

## 2020-09-24 RX ADMIN — METHOCARBAMOL TABLETS 1000 MG: 500 TABLET, COATED ORAL at 22:33

## 2020-09-24 RX ADMIN — POLYETHYLENE GLYCOL 3350 17 G: 17 POWDER, FOR SOLUTION ORAL at 08:33

## 2020-09-24 RX ADMIN — LORAZEPAM 0.5 MG: 0.5 TABLET ORAL at 04:17

## 2020-09-24 RX ADMIN — LIDOCAINE HYDROCHLORIDE 10 ML: 20 SOLUTION ORAL; TOPICAL at 05:24

## 2020-09-24 RX ADMIN — ACETAMINOPHEN 650 MG: 325 TABLET, FILM COATED ORAL at 05:24

## 2020-09-24 RX ADMIN — AMILORIDE HYDROCLORIDE 5 MG: 5 TABLET ORAL at 22:33

## 2020-09-24 RX ADMIN — LIDOCAINE 5% 2 PATCH: 700 PATCH TOPICAL at 08:33

## 2020-09-24 RX ADMIN — ACETAMINOPHEN 650 MG: 325 TABLET, FILM COATED ORAL at 11:02

## 2020-09-24 RX ADMIN — INSULIN GLARGINE 30 UNITS: 100 INJECTION, SOLUTION SUBCUTANEOUS at 08:33

## 2020-09-24 RX ADMIN — INSULIN LISPRO 20 UNITS: 100 INJECTION, SOLUTION INTRAVENOUS; SUBCUTANEOUS at 08:37

## 2020-09-24 RX ADMIN — FLUTICASONE PROPIONATE 2 SPRAY: 50 SPRAY, METERED NASAL at 08:40

## 2020-09-24 RX ADMIN — OXYCODONE HYDROCHLORIDE 5 MG: 5 TABLET ORAL at 08:33

## 2020-09-24 RX ADMIN — GABAPENTIN 600 MG: 300 CAPSULE ORAL at 18:13

## 2020-09-24 RX ADMIN — PANTOPRAZOLE SODIUM 40 MG: 40 TABLET, DELAYED RELEASE ORAL at 05:24

## 2020-09-24 RX ADMIN — AMILORIDE HYDROCLORIDE 5 MG: 5 TABLET ORAL at 08:33

## 2020-09-24 RX ADMIN — POTASSIUM CHLORIDE 20 MEQ: 1500 TABLET, EXTENDED RELEASE ORAL at 08:36

## 2020-09-24 RX ADMIN — METHOCARBAMOL TABLETS 750 MG: 750 TABLET, COATED ORAL at 11:02

## 2020-09-24 RX ADMIN — OMEGA-3 FATTY ACIDS CAP 1000 MG 1000 MG: 1000 CAP at 08:33

## 2020-09-24 RX ADMIN — MELATONIN 6 MG: at 22:34

## 2020-09-24 NOTE — PROGRESS NOTES
NEPHROLOGY PROGRESS NOTE   Kevon Roddy 62 y o  male MRN: [id number]  [facility] Encounter: [id number]    ASSESSMENT & PLAN:  44-year-old male with history of CHF ejection fraction 40%, AFib on Xarelto sleep apnea, history of squamous cell carcinoma of the time on cetuximab/RT presented with complain of recurrent firing of ICD  He was found to be having VT episodes  Was given amiodarone bolus followed by amiodarone drip and Lidocaine infusion on admission in ICU  Nephrology consulted for hypokalemia  Follows outpatient with Dr Diego Raímrez and was on amiloride 10 mg in the past but has been off it recently  Hypokalemia, likely from renal loss with the use of diuretics  -potassium level was 1 8 on 09/20  -potassium today 3 8  -hypokalemia resolved with replacement as well as with initiation of Amiloride 5 mg twice daily since 09/22  -continue amiloride 5 mg twice daily, potassium chloride 20 mEq twice daily  In the past has been intolerant of spironolactone as it caused hypotension  -was resumed on home dose torsemide 60 mg bid on 9/23 by cardiology  -continue to monitor potassium level  IF potassium drops, may increase kcl dose     Chronic kidney disease stage 3  -recent baseline creatinine around 1 4-2 3  -renal function has been fluctuating based on volume status   -chronic kidney disease likely due to cardiorenal syndrome, age-related nephron loss  -currently renal function stable at creatinine 1 5-1 6, cr today 1 6, continue to monitor    Fluid overload/cardiomyopathy with ejection fraction 40% to 45%  - weight trending down  Continue to monitor with diuresis  -as outpatient has been on torsemide 60 mg twice daily along with metolazone 2 5 mg twice a week  -patient is being followed by heart failure team, now back on torsemide since 9/23    Ventricular tachycardia and ICD shocks:  Continue management per Cardiology  If P following with daily interrogation    Suspected to be secondary to hypokalemia on presentation    Diabetes mellitus type 2, last A1c 10 6, continue management per primary team     Obstructive sleep apnea on BiPAP night    Discussed with Dr Feli Hernandez:  No new complaints  No chest pain and no SOB    OBJECTIVE:  Current Weight: Weight - Scale: 133 kg (294 lb 1 5 oz)  Vitals:    09/24/20 0850   BP: 100/60   Pulse:    Resp:    Temp:    SpO2:        Intake/Output Summary (Last 24 hours) at 9/24/2020 1017  Last data filed at 9/24/2020 0730  Gross per 24 hour   Intake 1330 ml   Output    Net 1330 ml       Physical Exam  General:  Ill looking, awake  Eyes: Conjunctivae pink,  Sclera anicteric  ENT: lips and mucous membranes moist  Neck: supple   Chest: Clear to Auscultation both lungs,  no crackles, ronchus or wheezing  CVS: S1 & S2 present, normal rate, regular rhythm, no murmur    Abdomen: soft, non-tender, non-distended, Bowel sounds normoactive  Extremities: no edema of  legs  Skin: no rash  Neuro: awake, alert, oriented x 3  Psych: Mood and affect appropriate         Medications:    Current Facility-Administered Medications:     acetaminophen (TYLENOL) tablet 650 mg, 650 mg, Oral, Q4H PRN, Capri Nino MD, 650 mg at 09/24/20 0524    al mag oxide-diphenhydramine-lidocaine viscous (MAGIC MOUTHWASH) suspension 10 mL, 10 mL, Swish & Swallow, Q4H PRN, Capri Nino MD, 10 mL at 09/24/20 0524    AMILoride tablet 5 mg, 5 mg, Oral, BID, Capri Nino MD, 5 mg at 09/24/20 8474    amiodarone tablet 200 mg, 200 mg, Oral, BID With Meals, Capri Nino MD, 200 mg at 09/24/20 0582    aspirin chewable tablet 81 mg, 81 mg, Oral, Daily, Capri Nino MD, 81 mg at 09/24/20 0836    atorvastatin (LIPITOR) tablet 80 mg, 80 mg, Oral, Daily With Tommie Alfonso MD, 80 mg at 09/23/20 1730    ferrous sulfate tablet 325 mg, 325 mg, Oral, Every Other Day, Capri Nino MD, 325 mg at 09/23/20 0841    fish oil capsule 1,000 mg, 1,000 mg, Oral, Daily, Anthony Thornton Kilo Fabian MD, 1,000 mg at 09/24/20 0833    fluticasone (FLONASE) 50 mcg/act nasal spray 2 spray, 2 spray, Each Nare, Daily, Jazmin Vo MD, 2 spray at 09/24/20 0840    gabapentin (NEURONTIN) capsule 600 mg, 600 mg, Oral, TID, Jazmin Vo MD, 600 mg at 09/24/20 0836    insulin glargine (LANTUS) subcutaneous injection 30 Units 0 3 mL, 30 Units, Subcutaneous, Q12H [address], Jazmin Vo MD, 30 Units at 09/24/20 0833    insulin lispro (HumaLOG) 100 units/mL subcutaneous injection 2-12 Units, 2-12 Units, Subcutaneous, TID AC, 2 Units at 09/23/20 1731 **AND** Fingerstick Glucose (POCT), , , TID AC, Jazmin Vo MD    insulin lispro (HumaLOG) 100 units/mL subcutaneous injection 20 Units, 20 Units, Subcutaneous, TID With Meals, Jazmin Vo MD, 20 Units at 09/24/20 0837    isosorbide mononitrate (IMDUR) 24 hr tablet 30 mg, 30 mg, Oral, Daily, Trinidad Manzano MD    lidocaine (LIDODERM) 5 % patch 2 patch, 2 patch, Topical, Daily, Jazmin Vo MD, 2 patch at 09/24/20 8563    LORazepam (ATIVAN) tablet 0 5 mg, 0 5 mg, Oral, HS, Jazmin Vo MD, 0 5 mg at 09/23/20 2238    LORazepam (ATIVAN) tablet 0 5 mg, 0 5 mg, Oral, Q8H PRN, Guerda Howard DO, 0 5 mg at 09/24/20 0417    melatonin tablet 6 mg, 6 mg, Oral, HS, Jazmin Vo MD, 6 mg at 09/23/20 2238    methocarbamol (ROBAXIN) tablet 750 mg, 750 mg, Oral, Q6H PRN, Guerda Howard DO, 750 mg at 09/24/20 0228    metoprolol tartrate (LOPRESSOR) partial tablet 12 5 mg, 12 5 mg, Oral, Q12H [address], Trinidad Manzano MD, 12 5 mg at 09/23/20 2015    oxyCODONE (ROXICODONE) IR tablet 2 5 mg, 2 5 mg, Oral, Q4H PRN **OR** oxyCODONE (ROXICODONE) IR tablet 5 mg, 5 mg, Oral, Q4H PRN, Jazmin Vo MD, 5 mg at 09/24/20 0833    pantoprazole (PROTONIX) EC tablet 40 mg, 40 mg, Oral, Early Morning, Jazmin Vo MD, 40 mg at 09/24/20 7981    polyethylene glycol (MIRALAX) packet 17 g, 17 g, Oral, Daily, Jazmin Vo MD, 17 g at 09/24/20 2372   potassium chloride (K-DUR,KLOR-CON) CR tablet 20 mEq, 20 mEq, Oral, BID, Zohra Burks MD, 20 mEq at 09/24/20 0836    rivaroxaban (XARELTO) tablet 20 mg, 20 mg, Oral, Daily With Ramez Bryant MD, 20 mg at 09/23/20 1730    senna (SENOKOT) tablet 8 6 mg, 1 tablet, Oral, HS PRN, Zohra Burks MD    torsemide [facility]) tablet 60 mg, 60 mg, Oral, BID (diuretic), Kartik Fuentes MD, 60 mg at 09/24/20 0836    Invasive Devices:        Lab Results:   Results from last 7 days   Lab Units 09/24/20  0506 09/23/20  2048 09/23/20  0559  09/22/20  0508  09/21/20  1402 09/21/20  0427 09/21/20  0006 09/20/20  2357   WBC Thousand/uL  --   --  6 51  --  9 04  --   --  12 39*  --  15 34*   HEMOGLOBIN g/dL  --   --  12 7  --  12 1  --   --  11 8*  --  12 8   I STAT HEMOGLOBIN g/dl  --   --   --   --   --   --   --   --  13 9  --    HEMATOCRIT %  --   --  42 0  --  38 8  --   --  38 6  --  40 9   HEMATOCRIT, ISTAT %  --   --   --   --   --   --   --   --  41  --    PLATELETS Thousands/uL  --   --  262  --  238  --   --  259  --  343   POTASSIUM mmol/L 3 8 3 8 4 3   < > 3 8   < > 3 0* 2 7*  --  1 8*   CHLORIDE mmol/L 103 106 110*   < > 108   < > 100 96*  --  94*   CO2 mmol/L 28 28 26   < > 31   < > 29 32  --  28   CO2, I-STAT mmol/L  --   --   --   --   --   --   --   --  32  --    BUN mg/dL 23 20 17   < > 18   < > 20 23  --  28*   CREATININE mg/dL 1 61* 1 55* 1 50*   < > 1 56*   < > 1 54* 1 75*  --  2 15*   CALCIUM mg/dL 8 8 8 9 8 8   < > 8 6   < > 8 3 8 2*  --  8 6   MAGNESIUM mg/dL 2 1 2 1  --   --   --   --   --  2 6  --  1 8   PHOSPHORUS mg/dL  --   --   --   --   --   --   --  3 4  --  1 7*   ALK PHOS U/L  --   --   --   --   --   --  135* 142*  --  148*   ALT U/L  --   --   --   --   --   --  26 29  --  28   AST U/L  --   --   --   --   --   --  30 28  --  23   GLUCOSE, ISTAT mg/dl  --   --   --   --   --   --   --   --  167*  --     < > = values in this interval not displayed         Previous work up:   CHEST    INDICATION:   vtach      COMPARISON:  4/26/2014     EXAM PERFORMED/VIEWS:  XR CHEST PORTABLE        FINDINGS:  Unipolar AICD is unchanged in position  External pacemaker pad is also identified      Cardiomegaly is noted      Mild pulmonary vascular congestion is new  There is hypoventilation also noted  No focal infiltrate is identified      Surgical clips are noted at the base of the neck  PICC catheter is noted with tip in SVC      Osseous structures appear within normal limits for patient age      IMPRESSION:     Cardiomegaly with mild pulmonary vascular congestion  Portions of the record may have been created with voice recognition software  Occasional wrong word or "sound a like" substitutions may have occurred due to the inherent limitations of voice recognition software  Read the chart carefully and recognize, using context, where substitutions have occurred  If you have any questions, please contact the dictating provider

## 2020-09-24 NOTE — PLAN OF CARE
Problem: Prexisting or High Potential for Compromised Skin Integrity  Goal: Skin integrity is maintained or improved  Description: INTERVENTIONS:  - Identify patients at risk for skin breakdown  - Assess and monitor skin integrity  - Assess and monitor nutrition and hydration status  - Monitor labs   - Assess for incontinence   - Turn and reposition patient  - Assist with mobility/ambulation  - Relieve pressure over bony prominences  - Avoid friction and shearing  - Provide appropriate hygiene as needed including keeping skin clean and dry  - Evaluate need for skin moisturizer/barrier cream  - Collaborate with interdisciplinary team   - Patient/family teaching  - Consider wound care consult   Outcome: Progressing     Problem: Potential for Falls  Goal: Patient will remain free of falls  Description: INTERVENTIONS:  - Assess patient frequently for physical needs  -  Identify cognitive and physical deficits and behaviors that affect risk of falls    -  [place] fall precautions as indicated by assessment   - Educate patient/family on patient safety including physical limitations  - Instruct patient to call for assistance with activity based on assessment  - Modify environment to reduce risk of injury  - Consider OT/PT consult to assist with strengthening/mobility  Outcome: Progressing     Problem: PAIN - ADULT  Goal: Verbalizes/displays adequate comfort level or baseline comfort level  Description: Interventions:  - Encourage patient to monitor pain and request assistance  - Assess pain using appropriate pain scale  - Administer analgesics based on type and severity of pain and evaluate response  - Implement non-pharmacological measures as appropriate and evaluate response  - Consider cultural and social influences on pain and pain management  - Notify physician/advanced practitioner if interventions unsuccessful or patient reports new pain  Outcome: Progressing     Problem: INFECTION - ADULT  Goal: Absence or prevention of progression during hospitalization  Description: INTERVENTIONS:  - Assess and monitor for signs and symptoms of infection  - Monitor lab/diagnostic results  - Monitor all insertion sites, i e  indwelling lines, tubes, and drains  - Administer medications as ordered  - Instruct and encourage patient and family to use good hand hygiene technique  - Identify and instruct in appropriate isolation precautions for identified infection/condition  Outcome: Progressing     Problem: SAFETY ADULT  Goal: Patient will remain free of falls  Description: INTERVENTIONS:  - Assess patient frequently for physical needs  -  Identify cognitive and physical deficits and behaviors that affect risk of falls  -  [place] fall precautions as indicated by assessment   - Educate patient/family on patient safety including physical limitations  - Instruct patient to call for assistance with activity based on assessment  - Modify environment to reduce risk of injury  - Consider OT/PT consult to assist with strengthening/mobility  Outcome: Progressing     Problem: Knowledge Deficit  Goal: Patient/family/caregiver demonstrates understanding of disease process, treatment plan, medications, and discharge instructions  Description: Complete learning assessment and assess knowledge base    Interventions:  - Provide teaching at level of understanding  - Provide teaching via preferred learning methods  Outcome: Progressing     Problem: CARDIOVASCULAR - ADULT  Goal: Maintains optimal cardiac output and hemodynamic stability  Description: INTERVENTIONS:  - Monitor I/O, vital signs and rhythm  - Monitor for S/S and trends of decreased cardiac output  - Administer and titrate ordered vasoactive medications to optimize hemodynamic stability  - Assess quality of pulses, skin color and temperature  - Assess for signs of decreased coronary artery perfusion  - Instruct patient to report change in severity of symptoms  Outcome: Progressing  Goal: Absence of cardiac dysrhythmias or at baseline rhythm  Description: INTERVENTIONS:  - Continuous cardiac monitoring, vital signs, obtain 12 lead EKG if ordered  - Administer antiarrhythmic and heart rate control medications as ordered  - Monitor electrolytes and administer replacement therapy as ordered  Outcome: Progressing     Problem: Nutrition/Hydration-ADULT  Goal: Nutrient/Hydration intake appropriate for improving, restoring or maintaining nutritional needs  Description: Monitor and assess patient's nutrition/hydration status for malnutrition  Collaborate with interdisciplinary team and initiate plan and interventions as ordered  Monitor patient's weight and dietary intake as ordered or per policy  Utilize nutrition screening tool and intervene as necessary  Determine patient's food preferences and provide high-protein, high-caloric foods as appropriate       INTERVENTIONS:  - Monitor oral intake, urinary output, labs, and treatment plans  - Assess nutrition and hydration status and recommend course of action  - Evaluate amount of meals eaten  - Assist patient with eating if necessary   - Allow adequate time for meals  - Recommend/ encourage appropriate diets, oral nutritional supplements, and vitamin/mineral supplements  - Order, calculate, and assess calorie counts as needed  - Recommend, monitor, and adjust tube feedings and TPN/PPN based on assessed needs  - Assess need for intravenous fluids  - Provide specific nutrition/hydration education as appropriate  - Include patient/family/caregiver in decisions related to nutrition  Outcome: Progressing

## 2020-09-24 NOTE — PROGRESS NOTES
Electrophysiology Progress Note - Domenica Galvan 62 y o  male MRN: [id number]    [facility] Encounter: [id number]      Subjective:   Patient seen and examined  No significant telemetry events overnight  Objective:     Vitals: Blood pressure 100/60, pulse 82, temperature 98 1 °F (36 7 °C), resp  rate 16, height 5' 10" (1 778 m), weight 133 kg (294 lb 1 5 oz), SpO2 95 %  , Body mass index is 42 2 kg/m² ,   Orthostatic Blood Pressures      Most Recent Value   Blood Pressure  100/60 filed at 09/24/2020 4674   Patient Position - Orthostatic VS  Lying filed at 09/24/2020 0850            Intake/Output Summary (Last 24 hours) at 9/24/2020 1042  Last data filed at 9/24/2020 0730  Gross per 24 hour   Intake 1330 ml   Output    Net 1330 ml         Physical Exam:    GEN: Domenica Galvan appears well, alert and oriented x 3, pleasant and cooperative   HEENT:  Normocephalic, atraumatic, anicteric, moist mucous membranes  NECK: No JVD or carotid bruits   HEART: Regular rhythm, normal rate, normal S1 and S2, no murmurs, clicks, gallops or rubs; chest wall tender to palpation   LUNGS: Clear to auscultation bilaterally; no wheezes, rales, or rhonchi; respiration nonlabored   ABDOMEN:  Normoactive bowel sounds, soft, no tenderness, no distention  EXTREMITIES: peripheral pulses palpable; no edema  NEURO: no gross focal findings; cranial nerves grossly intact   SKIN:  Dry, intact, warm to touch      Current Facility-Administered Medications:     acetaminophen (TYLENOL) tablet 650 mg, 650 mg, Oral, Q4H PRN, Aureliano Bynum MD, 650 mg at 09/24/20 0524    al mag oxide-diphenhydramine-lidocaine viscous (MAGIC MOUTHWASH) suspension 10 mL, 10 mL, Swish & Swallow, Q4H PRN, Aureliano Bynum MD, 10 mL at 09/24/20 0524    AMILoride tablet 5 mg, 5 mg, Oral, BID, Aureliano Bynum MD, 5 mg at 09/24/20 9824    amiodarone tablet 200 mg, 200 mg, Oral, BID With Meals, Aureliano Bynum MD, 200 mg at 09/24/20 0836    aspirin chewable tablet 81 mg, 81 mg, Oral, Daily, Taj Valdes MD, 81 mg at 09/24/20 0836    atorvastatin (LIPITOR) tablet 80 mg, 80 mg, Oral, Daily With Haritha Alvarado MD, 80 mg at 09/23/20 1730    ferrous sulfate tablet 325 mg, 325 mg, Oral, Every Other Day, Taj Valdes MD, 325 mg at 09/23/20 0841    fish oil capsule 1,000 mg, 1,000 mg, Oral, Daily, Taj Valdes MD, 1,000 mg at 09/24/20 0833    fluticasone (FLONASE) 50 mcg/act nasal spray 2 spray, 2 spray, Each Nare, Daily, Taj Valdes MD, 2 spray at 09/24/20 0840    gabapentin (NEURONTIN) capsule 600 mg, 600 mg, Oral, TID, Taj Valdes MD, 600 mg at 09/24/20 0836    insulin glargine (LANTUS) subcutaneous injection 30 Units 0 3 mL, 30 Units, Subcutaneous, Q12H [facility], Taj Valdes MD, 30 Units at 09/24/20 0833    insulin lispro (HumaLOG) 100 units/mL subcutaneous injection 2-12 Units, 2-12 Units, Subcutaneous, TID AC, 2 Units at 09/23/20 1731 **AND** Fingerstick Glucose (POCT), , , TID AC, Taj Valdes MD    insulin lispro (HumaLOG) 100 units/mL subcutaneous injection 20 Units, 20 Units, Subcutaneous, TID With Meals, Taj Valdes MD, 20 Units at 09/24/20 0837    isosorbide mononitrate (IMDUR) 24 hr tablet 30 mg, 30 mg, Oral, Daily, Arthur Renee MD    lidocaine (LIDODERM) 5 % patch 2 patch, 2 patch, Topical, Daily, Taj Valdes MD, 2 patch at 09/24/20 7931    LORazepam (ATIVAN) tablet 0 5 mg, 0 5 mg, Oral, HS, Taj Valdes MD, 0 5 mg at 09/23/20 2238    LORazepam (ATIVAN) tablet 0 5 mg, 0 5 mg, Oral, Q8H PRN, Shira Wallace DO, 0 5 mg at 09/24/20 0417    melatonin tablet 6 mg, 6 mg, Oral, HS, Taj Valdes MD, 6 mg at 09/23/20 2238    methocarbamol (ROBAXIN) tablet 750 mg, 750 mg, Oral, Q6H PRN, Shira Wallace DO, 750 mg at 09/24/20 0228    metoprolol tartrate (LOPRESSOR) partial tablet 12 5 mg, 12 5 mg, Oral, Q12H [facility], Arthur Renee MD, 12 5 mg at 09/23/20 2015    oxyCODONE (ROXICODONE) IR tablet 2 5 mg, 2 5 mg, Oral, Q4H PRN **OR** oxyCODONE (ROXICODONE) IR tablet 5 mg, 5 mg, Oral, Q4H PRN, Shirley Ledezma MD, 5 mg at 09/24/20 0833    pantoprazole (PROTONIX) EC tablet 40 mg, 40 mg, Oral, Early Morning, Shirley Ledezma MD, 40 mg at 09/24/20 0524    polyethylene glycol (MIRALAX) packet 17 g, 17 g, Oral, Daily, Shirley Ledezma MD, 17 g at 09/24/20 5021    potassium chloride (K-DUR,KLOR-CON) CR tablet 20 mEq, 20 mEq, Oral, BID, Shirley Ledezma MD, 20 mEq at 09/24/20 0836    rivaroxaban (XARELTO) tablet 20 mg, 20 mg, Oral, Daily With Rachelle Bullard MD, 20 mg at 09/23/20 1730    senna (SENOKOT) tablet 8 6 mg, 1 tablet, Oral, HS PRN, Shirley Ledezma MD    torsemide (DEMADEX) tablet 60 mg, 60 mg, Oral, BID (diuretic), Mckenzie Wright MD, 60 mg at 09/24/20 0836    Labs & Results:    Lab Results   Component Value Date    CKTOTAL 160 02/24/2014    CKTOTAL 130 02/24/2014    CKTOTAL 148 02/23/2014    TROPONINI 2 07 (H) 09/21/2020    TROPONINI 2 49 (H) 09/21/2020    TROPONINI 2 38 (H) 09/21/2020       Lab Results   Component Value Date    GLUCOSE 167 (H) 09/21/2020    CALCIUM 8 8 09/24/2020     04/26/2014    K 3 8 09/24/2020    CO2 28 09/24/2020     09/24/2020    BUN 23 09/24/2020    CREATININE 1 61 (H) 09/24/2020       Lab Results   Component Value Date    WBC 6 51 09/23/2020    HGB 12 7 09/23/2020    HCT 42 0 09/23/2020    MCV 84 09/23/2020     09/23/2020     Results from last 7 days   Lab Units 09/20/20  2357   INR  1 97*       No results found for: CHOL  No results found for: HDL  No results found for: LDLCALC  No results found for: TRIG    Lab Results   Component Value Date    ALT 26 09/21/2020    AST 30 09/21/2020         Telemetry:   Personally reviewed by Ning Brink MD:  Sinus rhythm with heart rate ranging 60 to 80s and rare ventricular ectopy    Imaging:  No new cardiac images to be reviewed      VTE Prophylaxis:  Rivaroxaban      Assessment:  Principal Problem:    Ventricular tachycardia (HCC)  Active Problems:    Hypokalemia    CAD (coronary artery disease)    Stage 3 chronic kidney disease (HCC)    CHF (congestive heart failure) ([facility])    Elevated troponin    Obstructive sleep apnea    Cardiomyopathy ([facility] )    [facility]ox[facility]mal [facility]fib ([facility] )    Type 2 diabetes mellitus, with long-term current use of insulin ([facility] )      1  Ventricular fibrillation store s/p 55 ICD shocks  -multifactorial etiology:  metolazone, torsemide, cetuximab and to lesser extent insulin  -[employer] Visia SC-ICD initially implanted 04/2009 with generator upgrade to MRI conditional device 11/2017  -R on T phenomenon preceding each episode  -amiodarone drip; lidocaine discontinued  -K 1 8 -> 3 8  -no further recurrence on telemetry at [facility]  -battery life documented 7 4 years as of June, down to 11 months on 9/23     2  Mixed ischemic/nonischemic cardiomyopathy  -known CAD, alcohol use, JONNA on CPAP  -4/2019 Lexiscan:  Mid inferior/inferolateral infarct, gated EF 34%, dilated and inferior akinesis  -09/2019 TTE:  LVEF 50%, LVIDd 6 4 cm, normal RV size/function, LA 4 7 cm; no significant valvulopathy  -CAD: Aspirin, atorvastatin, isosorbide mononitrate  -CHF: amiloride, torsemide, low-dose metoprolol tartrate  -presenting NT-proBNP 614, can be falsely low due to obesity     3  Paroxysmal atrial fibrillation  -currently in sinus mechanism  -RF: JONNA, previous alcohol use, obesity  -[id number] 4, HAS-BLED 2  -LA 4 7 cm  -rivaroxaban, amiodarone PO, low-dose metoprolol tartrate      Plan:  1  Patient's battery life stabilized at 11 months on today's interrogation (similar to yesterday)  Maintain on current regiment  2  Resumed on torsemide by heart failure  Continue monitoring potassium level as per Advanced Heart Failure and Nephrology  3  Patient can be taken of telemetry briefly for shower if need be      4  Patient potentially could follow-up as an outpatient with primary cardiologist to arrange timing of generator change if remains stable  Case discussed and reviewed with Dr Michelle Greene who agrees with my assessment and plan  Thank you for involving us in the care of your patient  Epic/ Allscripts/Care Everywhere records reviewed: yes    ** Please Note: Fluency DirectDictation voice to text software may have been used in the creation of this document   **

## 2020-09-24 NOTE — PROGRESS NOTES
Progress Note - Renato Kanner 62 y o  male MRN: [id number]    Unit/Bed#: [facility] 727-01 Encounter: [id number]    * Ventricular tachycardia [facility])  Assessment & Plan  V-tach stom s/p 55 ICD shocks likely d/t diuretics, hypokalemia, Cetuximab  Medtronic Visia SC-ICD initially implanted 04/2009 with generator upgrade to MRI conditional device 11/2017  No further recurrence on telemetry   EP 09/23 device interrogation- decreasing battery life from 16 to 11 months  EP following with interrogations over next day- may need inpatient generator change   On oral Amiodarone and Lopressor  Continue to monitor potassium to prevent further arrhythmias  Follow EP recommendations     Hypokalemia  Assessment & Plan  K+ initially 1 8, improved to 4 3 s/p 400 mEq of KCl and initiation of Amiloride  K+ 3 8 today  Nephrology following- continue Amiloride 5 mg BID, KCl 20 mEq BID   Continue to monitor potassium      Type 2 diabetes mellitus, with long-term current use of insulin [facility])  Assessment & Plan        Lab Results   Component Value Date     HGBA1C 10 6 (H) 09/10/2020                Recent Labs     09/22/20  2109 09/22/20  2149 09/23/20  0639 09/23/20  1047   POCGLU 68 99 122 116         Blood Sugar Average: Last 72 hrs:  (P) 780 [id number]      Glucose 129 this AM  Poorly-controlled diabetes  Continue current Lantus and Humalog t i d   With meals  Continue insulin sliding scale     Paroxysmal A-fib (HCC)  Assessment & Plan  Currently in sinus rhythm   [id number] 4, HAS-BLED 2  Continue Xarelto      Cardiomyopathy ([facility] )  Assessment & Plan  Underlying CAD with EF 40-45%  Per Cardiology combination of mixed ischemic/nonischemic cardiomyopathy  Continue aspirin, statin, Imdur and Lopressor     Obstructive sleep apnea  Assessment & Plan  Continue BiPAP qHs     CHF (congestive heart failure) ([facility])  Assessment & Plan      Wt Readings from Last 3 Encounters:   09/23/20 135 kg (297 lb 6 4 oz)   05/02/14 (!) 153 kg (336 lb 15 9 oz)    Echo 09/14/20: LVEF 40-45%, LVEDd 6 7 cm, DD1, severe LAE  OP regimen: torsemide 60mg BID, metolazone 2 5mg MWF (with prn increase to 5mg when he though he was overloaded), K 20mEq BID; not on BB  Current regimen: Imdur 30mg, Amiloride 5mg BID, Metorpolol tartrate 12 5mg BID  Dry weight ~280-285 lbs, Current weight 294 lbs  Heart failure following- continue Amiloride, continue Torsemide 60mg BID with close monitoring of K+, schedule KDur 20 mEq BIG, plan to change metoprolol to succinate 12 5 or 25 mg daily on discharge      Stage 3 chronic kidney disease ([facility] [address] )  Assessment & Plan  CKD likely d/t cardiorenal syndrome and age-related nephron loss  Baseline Creatinine 1 4-2 3  Creatinine remaining stable at today 1 61 (1 55 yesterday)  Nephrology is following  Continue to monitor      Subjective:   Mr Kadeem Blanco was seen lying comfortably in his bed this morning  No events on telemetry overnight  Patient reports a restful night sleep  Patient still complains of mild chest discomfort  Patient denies palpitations, increasing SOB, lightheadedness, dizziness, or other symptoms  Patient reports appropriate urine output  Patient has been moving his bowels with Miralax  Patient denies any questions or concerns this morning  Objective:     Vitals: Blood pressure 100/54, pulse 73, temperature 98 6 °F (37 °C), resp  rate 18, height 5' 10" (1 778 m), weight 133 kg (294 lb 1 5 oz), SpO2 93 %  ,Body mass index is 42 2 kg/m²        Intake/Output Summary (Last 24 hours) at 9/24/2020 0735  Last data filed at 9/23/2020 2100  Gross per 24 hour   Intake 910 ml   Output    Net 910 ml       Physical Exam: BP 93/53   Pulse 82   Temp 98 1 °F (36 7 °C)   Resp 16   Ht 5' 10" (1 778 m)   Wt 133 kg (294 lb 1 5 oz)   SpO2 95%   BMI 42 20 kg/m²     General Appearance:    Alert, cooperative, no distress, appears stated age   Head:    Normocephalic, without obvious abnormality, atraumatic   Eyes:    PERRL, conjunctiva/corneas clear, EOM's intact, fundi     benign, both eyes        Ears:    Normal TM's and external ear canals, both ears   Nose:   Nares normal, septum midline, mucosa normal, no drainage    or sinus tenderness   Throat:   Lips, mucosa, and tongue normal; teeth and gums normal   Neck:   Supple, symmetrical, trachea midline, no adenopathy;        thyroid:  No enlargement/tenderness/nodules; no carotid    bruit or JVD   Back:     Symmetric, no curvature, ROM normal, no CVA tenderness   Lungs:     Clear to auscultation bilaterally, respirations unlabored   Chest wall:    No tenderness or deformity   Heart:    Regular rate and rhythm, S1 and S2 normal, no murmur, rub   or gallop   Abdomen:     Soft, non-tender, bowel sounds active all four quadrants,     no masses, no organomegaly   Genitalia:    Normal male without lesion, discharge or tenderness   Rectal:    Normal tone, normal prostate, no masses or tenderness;    guaiac negative stool   Extremities:   Extremities normal, atraumatic, no cyanosis or edema   Pulses:   2+ and symmetric all extremities   Skin:   Skin color, texture, turgor normal, no rashes or lesions   Lymph nodes:   Cervical, supraclavicular, and axillary nodes normal   Neurologic:   CNII-XII intact  Normal strength, sensation and reflexes       throughout        Invasive Devices     Peripheral Intravenous Line            Peripheral IV 09/21/20 Left Forearm 3 days                Lab, Imaging and other studies: I have personally reviewed pertinent reports

## 2020-09-24 NOTE — ASSESSMENT & PLAN NOTE
Wt Readings from Last 3 Encounters:   09/24/20 133 kg (294 lb 1 5 oz)   05/02/14 (!) 153 kg (336 lb 15 9 oz)       Chronic combined CHF EF 40-45%  OP regimen: torsemide 60mg BID, metolazone 2 5mg MWF (with prn increase to 5mg when he though he was overloaded), K 20mEq BID; not on BB  Heart failure is following  Restarted on torsemide

## 2020-09-24 NOTE — PROGRESS NOTES
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  EP is following with daily interrogation  No further episode on tele  On oral amiodarone and Lopressor  Potassium replacement  Follow on EP recommendation regarding battery life span and possible inpatient generator change      Hypokalemia  Assessment & Plan  Resolved  Continue amiloride for potassium-sparing  Continue potassium supplement    Type 2 diabetes mellitus, with long-term current use of insulin [facility])  Assessment & Plan  Lab Results   Component Value Date    HGBA1C 10 6 (H) 09/10/2020       Recent Labs     09/23/20  1626 09/23/20  2122 09/24/20  0644 09/24/20  1044   POCGLU 160* 133 126 134       Blood Sugar Average: Last 72 hrs:  (P) 140 [id number]     Poorly-controlled diabetes  Continue current Lantus and Humalog t i d  With meals  Continue insulin sliding scale    Paroxysmal A-fib ([facility])  Assessment & Plan  Continue Xarelto    Cardiomyopathy [facility])  Assessment & Plan  Underlying CAD with EF 40-45%  Per Cardiology, combination of mixed ischemic/nonischemic cardiomyopathy  Continue aspirin, statin, Imdur and Lopressor    Obstructive sleep apnea  Assessment & Plan  Continue BiPAP q h s      CHF (congestive heart failure) ([facility])  Assessment & Plan  Wt Readings from Last 3 Encounters:   09/24/20 133 kg (294 lb 1 5 oz)   05/02/14 (!) 153 kg (336 lb 15 9 oz)       Chronic combined CHF EF 40-45%  OP regimen: torsemide 60mg BID, metolazone 2 5mg MWF (with prn increase to 5mg when he though he was overloaded), K 20mEq BID; not on BB  Heart failure is following  Restarted on torsemide    Stage 3 chronic kidney disease ([facility] [address] )  Assessment & Plan  Stable  Nephrology is following         VTE Pharmacologic Prophylaxis:   Pharmacologic: Rivaroxaban (Xarelto)  Mechanical VTE Prophylaxis in Place: Yes    Patient Centered Rounds: I have performed bedside rounds with nursing staff today  Discussions with Specialists or Other Care Team Provider: STEPHY    Education and Discussions with Family / Patient:  I offered to call wife he declined    Time Spent for Care: 30 minutes  More than 50% of total time spent on counseling and coordination of care as described above  Current Length of Stay: 3 day(s)    Current Patient Status: Inpatient   Certification Statement: The patient will continue to require additional inpatient hospital stay due to Above    Discharge Plan / Estimated Discharge Date: TBD    Code Status: Level 1 - Full Code      Subjective:   Patient seen and examined  Comfortable in bed  Continue to have anterior chest wall discomfort  No shortness of breath  No nausea vomiting or diarrhea    Objective:     Vitals:   Temp (24hrs), Av 3 °F (36 8 °C), Min:98 1 °F (36 7 °C), Max:98 6 °F (37 °C)    Temp:  [98 1 °F (36 7 °C)-98 6 °F (37 °C)] 98 1 °F (36 7 °C)  HR:  [73-86] 76  Resp:  [16-18] 16  BP: ()/(53-84) 100/60  SpO2:  [93 %-98 %] 98 %  Body mass index is 42 2 kg/m²  Input and Output Summary (last 24 hours):        Intake/Output Summary (Last 24 hours) at 2020 1229  Last data filed at 2020 0730  Gross per 24 hour   Intake 1120 ml   Output    Net 1120 ml       Physical Exam:     Physical Exam    Patient is awake alert oriented no acute distress  Obese  Lung clear to auscultation bilateral  Heart positive S1-S2 no murmur  Abdomen soft obese nontender  Lower extremities no edema  Additional Data:     Labs:    Results from last 7 days   Lab Units 20  0559  20  0427   WBC Thousand/uL 6 51   < > 12 39*   HEMOGLOBIN g/dL 12 7   < > 11 8*   HEMATOCRIT % 42 0   < > 38  6   PLATELETS Thousands/uL 262   < > 259   NEUTROS PCT %  --   --  92*   LYMPHS PCT %  --   --  3*   MONOS PCT %  --   --  5   EOS PCT %  --   --  0    < > = values in this interval not displayed  Results from last 7 days   Lab Units 09/24/20  0506  09/21/20  1402  09/21/20  0006   POTASSIUM mmol/L 3 8   < > 3 0*   < >  --    CHLORIDE mmol/L 103   < > 100   < >  --    CO2 mmol/L 28   < > 29   < >  --    CO2, I-STAT mmol/L  --   --   --   --  32   BUN mg/dL 23   < > 20   < >  --    CREATININE mg/dL 1 61*   < > 1 54*   < >  --    CALCIUM mg/dL 8 8   < > 8 3   < >  --    ALK PHOS U/L  --   --  135*   < >  --    ALT U/L  --   --  26   < >  --    AST U/L  --   --  30   < >  --    GLUCOSE, ISTAT mg/dl  --   --   --   --  167*    < > = values in this interval not displayed  Results from last 7 days   Lab Units 09/20/20  2357   INR  1 97*       * I Have Reviewed All Lab Data Listed Above  * Additional Pertinent Lab Tests Reviewed: Mary 66 Admission Reviewed    Imaging:    Imaging Reports Reviewed Today Include:   Imaging Personally Reviewed by Myself Includes:    Recent Cultures (last 7 days):     Results from last 7 days   Lab Units 09/23/20  0559 09/23/20  0507   BLOOD CULTURE  No Growth at 24 hrs  No Growth at 24 hrs         Last 24 Hours Medication List:   Current Facility-Administered Medications   Medication Dose Route Frequency Provider Last Rate    acetaminophen  650 mg Oral Q4H PRN Vivi Calderon MD      al mag oxide-diphenhydramine-lidocaine viscous  10 mL Swish & Swallow Q4H PRN Vivi Calderon MD      AMILoride  5 mg Oral BID Vivi Calderon MD      amiodarone  200 mg Oral BID With Meals Vivi Calderon MD      aspirin  81 mg Oral Daily Vivi Calderon MD      atorvastatin  80 mg Oral Daily With Abeba Gross MD      ferrous sulfate  325 mg Oral Every Other Day Vivi Calderon MD      fish oil  1,000 mg Oral Daily MD Ary Alvarado fluticasone  2 spray Each Nare Daily Patricia Chambers MD      gabapentin  600 mg Oral TID Patricia Chambers MD      insulin glargine  30 Units Subcutaneous Q12H [address] Patricia Chambers MD      insulin lispro  2-12 Units Subcutaneous TID AC Patricia Chambers MD      insulin lispro  20 Units Subcutaneous TID With Meals Patricia Chambers MD      isosorbide mononitrate  30 mg Oral Daily Miranda Neumann MD      lidocaine  2 patch Topical Daily Patricia Chambers MD      LORazepam  0 5 mg Oral HS Patricia Chambers MD      LORazepam  0 5 mg Oral Q8H PRN Vernell Varghese DO      melatonin  6 mg Oral HS Patricia Chambers MD      methocarbamol  1,000 mg Oral [facility] Vernell Varghese DO      metoprolol tartrate  12 5 mg Oral Q12H [address] Miranda Neumann MD      oxyCODONE  2 5 mg Oral Q4H PRN Patricia Chambers MD      Or   Gerri Damian oxyCODONE  5 mg Oral Q4H PRN Patricia Chambers MD      pantoprazole  40 mg Oral Early Morning Patricia Chambers MD      polyethylene glycol  17 g Oral Daily Patricia Chambers MD      potassium chloride  20 mEq Oral BID Patricia Chambers MD      rivaroxaban  20 mg Oral Daily With Ezio Woods MD      senna  1 tablet Oral HS PRN Patricia Chambers MD      torsemide  60 mg Oral BID (diuretic) Miranda Neumann MD          Today, Patient Was Seen By: Vernell Varghese DO    ** Please Note: This note has been constructed using a voice recognition system   **

## 2020-09-24 NOTE — ASSESSMENT & PLAN NOTE
V-tach stom and ICD shocks likely multifactorial in the setting of diuretics, hypokalemia and Ctuximab for squamous cell carcinoma of the tongue  EP is following with daily interrogation  No further episode on tele  On oral amiodarone and Lopressor  Potassium replacement  Follow on EP recommendation regarding battery life span and possible inpatient generator change

## 2020-09-24 NOTE — RESTORATIVE TECHNICIAN NOTE
Restorative Specialist Mobility Note       Activity: Other (Comment), Up ad roddy(Educated/encouraged pt to ambulate with assistance 3-4 x's/day, pt states he ambulates Independently nursing aware   Pt callbell, phone/tray within reach )          [employer] BS, Restorative Technician, [employer]

## 2020-09-24 NOTE — ASSESSMENT & PLAN NOTE
Lab Results   Component Value Date    HGBA1C 10 6 (H) 09/10/2020       Recent Labs     09/23/20  1626 09/23/20  2122 09/24/20  0644 09/24/20  1044   POCGLU 160* 133 126 134       Blood Sugar Average: Last 72 hrs:  (P) 140 [id number]     Poorly-controlled diabetes  Continue current Lantus and Humalog t i d   With meals  Continue insulin sliding scale

## 2020-09-25 LAB
ANION GAP SERPL CALCULATED.3IONS-SCNC: 8 MMOL/L (ref 4–13)
BUN SERPL-MCNC: 28 MG/DL (ref 5–25)
CALCIUM SERPL-MCNC: 9.1 MG/DL (ref 8.3–10.1)
CHLORIDE SERPL-SCNC: 101 MMOL/L (ref 100–108)
CO2 SERPL-SCNC: 28 MMOL/L (ref 21–32)
CREAT SERPL-MCNC: 1.89 MG/DL (ref 0.6–1.3)
GFR SERPL CREATININE-BSD FRML MDRD: 38 ML/MIN/1.73SQ M
GLUCOSE SERPL-MCNC: 144 MG/DL (ref 65–140)
GLUCOSE SERPL-MCNC: 147 MG/DL (ref 65–140)
GLUCOSE SERPL-MCNC: 163 MG/DL (ref 65–140)
GLUCOSE SERPL-MCNC: 170 MG/DL (ref 65–140)
GLUCOSE SERPL-MCNC: 263 MG/DL (ref 65–140)
GLUCOSE SERPL-MCNC: 97 MG/DL (ref 65–140)
MAGNESIUM SERPL-MCNC: 2.2 MG/DL (ref 1.6–2.6)
POTASSIUM SERPL-SCNC: 3.6 MMOL/L (ref 3.5–5.3)
SODIUM SERPL-SCNC: 137 MMOL/L (ref 136–145)

## 2020-09-25 PROCEDURE — 83735 ASSAY OF MAGNESIUM: CPT | Performed by: INTERNAL MEDICINE

## 2020-09-25 PROCEDURE — 94660 CPAP INITIATION&MGMT: CPT

## 2020-09-25 PROCEDURE — 82948 REAGENT STRIP/BLOOD GLUCOSE: CPT

## 2020-09-25 PROCEDURE — 99232 SBSQ HOSP IP/OBS MODERATE 35: CPT | Performed by: INTERNAL MEDICINE

## 2020-09-25 PROCEDURE — NC001 PR NO CHARGE: Performed by: INTERNAL MEDICINE

## 2020-09-25 PROCEDURE — 94760 N-INVAS EAR/PLS OXIMETRY 1: CPT

## 2020-09-25 PROCEDURE — 80048 BASIC METABOLIC PNL TOTAL CA: CPT | Performed by: INTERNAL MEDICINE

## 2020-09-25 RX ORDER — POTASSIUM CHLORIDE 20 MEQ/1
40 TABLET, EXTENDED RELEASE ORAL 2 TIMES DAILY
Status: DISCONTINUED | OUTPATIENT
Start: 2020-09-25 | End: 2020-09-26 | Stop reason: HOSPADM

## 2020-09-25 RX ADMIN — GABAPENTIN 600 MG: 300 CAPSULE ORAL at 17:52

## 2020-09-25 RX ADMIN — TORSEMIDE 60 MG: 20 TABLET ORAL at 09:47

## 2020-09-25 RX ADMIN — LIDOCAINE HYDROCHLORIDE 10 ML: 20 SOLUTION ORAL; TOPICAL at 10:11

## 2020-09-25 RX ADMIN — LIDOCAINE HYDROCHLORIDE 10 ML: 20 SOLUTION ORAL; TOPICAL at 23:16

## 2020-09-25 RX ADMIN — ASPIRIN 81 MG CHEWABLE TABLET 81 MG: 81 TABLET CHEWABLE at 09:51

## 2020-09-25 RX ADMIN — METHOCARBAMOL TABLETS 1000 MG: 500 TABLET, COATED ORAL at 06:18

## 2020-09-25 RX ADMIN — INSULIN LISPRO 2 UNITS: 100 INJECTION, SOLUTION INTRAVENOUS; SUBCUTANEOUS at 10:10

## 2020-09-25 RX ADMIN — LORAZEPAM 0.5 MG: 0.5 TABLET ORAL at 21:42

## 2020-09-25 RX ADMIN — MELATONIN 6 MG: at 21:42

## 2020-09-25 RX ADMIN — INSULIN LISPRO 20 UNITS: 100 INJECTION, SOLUTION INTRAVENOUS; SUBCUTANEOUS at 12:45

## 2020-09-25 RX ADMIN — OXYCODONE HYDROCHLORIDE 5 MG: 5 TABLET ORAL at 22:16

## 2020-09-25 RX ADMIN — AMIODARONE HYDROCHLORIDE 200 MG: 200 TABLET ORAL at 18:00

## 2020-09-25 RX ADMIN — TORSEMIDE 60 MG: 20 TABLET ORAL at 17:53

## 2020-09-25 RX ADMIN — METHOCARBAMOL TABLETS 1000 MG: 500 TABLET, COATED ORAL at 14:13

## 2020-09-25 RX ADMIN — POTASSIUM CHLORIDE 40 MEQ: 1500 TABLET, EXTENDED RELEASE ORAL at 17:54

## 2020-09-25 RX ADMIN — OXYCODONE HYDROCHLORIDE 5 MG: 5 TABLET ORAL at 17:57

## 2020-09-25 RX ADMIN — INSULIN GLARGINE 30 UNITS: 100 INJECTION, SOLUTION SUBCUTANEOUS at 09:56

## 2020-09-25 RX ADMIN — GABAPENTIN 600 MG: 300 CAPSULE ORAL at 09:53

## 2020-09-25 RX ADMIN — POTASSIUM CHLORIDE 40 MEQ: 1500 TABLET, EXTENDED RELEASE ORAL at 09:51

## 2020-09-25 RX ADMIN — METHOCARBAMOL TABLETS 1000 MG: 500 TABLET, COATED ORAL at 21:41

## 2020-09-25 RX ADMIN — INSULIN LISPRO 20 UNITS: 100 INJECTION, SOLUTION INTRAVENOUS; SUBCUTANEOUS at 17:51

## 2020-09-25 RX ADMIN — Medication 12.5 MG: at 09:42

## 2020-09-25 RX ADMIN — OXYCODONE HYDROCHLORIDE 5 MG: 5 TABLET ORAL at 09:52

## 2020-09-25 RX ADMIN — LORAZEPAM 0.5 MG: 0.5 TABLET ORAL at 06:18

## 2020-09-25 RX ADMIN — Medication 12.5 MG: at 21:42

## 2020-09-25 RX ADMIN — RIVAROXABAN 20 MG: 20 TABLET, FILM COATED ORAL at 17:54

## 2020-09-25 RX ADMIN — FLUTICASONE PROPIONATE 2 SPRAY: 50 SPRAY, METERED NASAL at 09:59

## 2020-09-25 RX ADMIN — OMEGA-3 FATTY ACIDS CAP 1000 MG 1000 MG: 1000 CAP at 09:47

## 2020-09-25 RX ADMIN — OXYCODONE HYDROCHLORIDE 5 MG: 5 TABLET ORAL at 04:04

## 2020-09-25 RX ADMIN — ISOSORBIDE MONONITRATE 30 MG: 30 TABLET, EXTENDED RELEASE ORAL at 09:52

## 2020-09-25 RX ADMIN — PANTOPRAZOLE SODIUM 40 MG: 40 TABLET, DELAYED RELEASE ORAL at 06:18

## 2020-09-25 RX ADMIN — GABAPENTIN 600 MG: 300 CAPSULE ORAL at 21:42

## 2020-09-25 RX ADMIN — AMILORIDE HYDROCLORIDE 5 MG: 5 TABLET ORAL at 10:13

## 2020-09-25 RX ADMIN — INSULIN LISPRO 20 UNITS: 100 INJECTION, SOLUTION INTRAVENOUS; SUBCUTANEOUS at 10:10

## 2020-09-25 RX ADMIN — POLYETHYLENE GLYCOL 3350 17 G: 17 POWDER, FOR SOLUTION ORAL at 09:54

## 2020-09-25 RX ADMIN — AMILORIDE HYDROCLORIDE 5 MG: 5 TABLET ORAL at 22:16

## 2020-09-25 RX ADMIN — INSULIN GLARGINE 30 UNITS: 100 INJECTION, SOLUTION SUBCUTANEOUS at 22:16

## 2020-09-25 RX ADMIN — AMIODARONE HYDROCHLORIDE 200 MG: 200 TABLET ORAL at 09:51

## 2020-09-25 RX ADMIN — ACETAMINOPHEN 650 MG: 325 TABLET, FILM COATED ORAL at 06:17

## 2020-09-25 RX ADMIN — ATORVASTATIN CALCIUM 80 MG: 80 TABLET, FILM COATED ORAL at 17:54

## 2020-09-25 RX ADMIN — FERROUS SULFATE TAB 325 MG (65 MG ELEMENTAL FE) 325 MG: 325 (65 FE) TAB at 09:50

## 2020-09-25 NOTE — PROGRESS NOTES
Electrophysiology Progress Note - Jesus Manuel Valdovinos 62 y o  male MRN: [id number]    Unit/Bed#: [facility] 727-01 Encounter: [id number]        Subjective:   Patient seen and examined  No significant telemetry events overnight  Patient with no further recurrence of arrhythmia  Has minimal ventricular ectopy on telemetry overnight  Potassium this morning was 3 6  Objective:     Vitals: Blood pressure 99/67, pulse 81, temperature 98 °F (36 7 °C), resp  rate 18, height 5' 10" (1 778 m), weight 131 kg (289 lb 3 9 oz), SpO2 94 %  , Body mass index is 41 5 kg/m² ,   Orthostatic Blood Pressures      Most Recent Value   Blood Pressure  99/67 filed at 09/25/2020 0945   Patient Position - Orthostatic VS  Lying filed at 09/24/2020 0850            Intake/Output Summary (Last 24 hours) at 9/25/2020 0950  Last data filed at 9/25/2020 0800  Gross per 24 hour   Intake 600 ml   Output    Net 600 ml         Physical Exam:    GEN: Daniel Garcia appears well, alert and oriented x 3, pleasant and cooperative   HEENT:  Normocephalic, atraumatic, anicteric, moist mucous membranes  NECK: No JVD or carotid bruits   HEART: Regular rhythm, normal rate, normal S1 and S2, no murmurs, clicks, gallops or rubs; chest wall tender to palpation   LUNGS: Clear to auscultation bilaterally; no wheezes, rales, or rhonchi; respiration nonlabored   ABDOMEN:  Normoactive bowel sounds, soft, no tenderness, no distention  EXTREMITIES: peripheral pulses palpable; no edema  NEURO: no gross focal findings; cranial nerves grossly intact   SKIN:  Dry, intact, warm to touch      Current Facility-Administered Medications:     acetaminophen (TYLENOL) tablet 650 mg, 650 mg, Oral, Q4H PRN, Sharon Leon MD, 650 mg at 09/25/20 0617    al mag oxide-diphenhydramine-lidocaine viscous (MAGIC MOUTHWASH) suspension 10 mL, 10 mL, Swish & Swallow, Q4H PRN, Sharon Leon MD, 10 mL at 09/24/20 6794    AMILoride tablet 5 mg, 5 mg, Oral, BID, Sharon Leon MD, 5 mg at 09/24/20 2233    amiodarone tablet 200 mg, 200 mg, Oral, BID With Meals, Jefe Cordero MD, 200 mg at 09/24/20 1814    aspirin chewable tablet 81 mg, 81 mg, Oral, Daily, Jefe Cordero MD, 81 mg at 09/24/20 0836    atorvastatin (LIPITOR) tablet 80 mg, 80 mg, Oral, Daily With Sailaja Trent MD, 80 mg at 09/24/20 1815    ferrous sulfate tablet 325 mg, 325 mg, Oral, Every Other Day, Jefe Cordero MD, 325 mg at 09/23/20 0841    fish oil capsule 1,000 mg, 1,000 mg, Oral, Daily, Jefe Cordero MD, 1,000 mg at 09/24/20 0833    fluticasone (FLONASE) 50 mcg/act nasal spray 2 spray, 2 spray, Each Nare, Daily, Jefe Cordero MD, 2 spray at 09/24/20 0840    gabapentin (NEURONTIN) capsule 600 mg, 600 mg, Oral, TID, Jefe Cordero MD, 600 mg at 09/24/20 2233    insulin glargine (LANTUS) subcutaneous injection 30 Units 0 3 mL, 30 Units, Subcutaneous, Q12H [address], Jefe Cordero MD, 30 Units at 09/24/20 2244    insulin lispro (HumaLOG) 100 units/mL subcutaneous injection 2-12 Units, 2-12 Units, Subcutaneous, TID AC, 2 Units at 09/23/20 1731 **AND** Fingerstick Glucose (POCT), , , TID AC, Jefe Cordero MD    insulin lispro (HumaLOG) 100 units/mL subcutaneous injection 20 Units, 20 Units, Subcutaneous, TID With Meals, Jefe Cordero MD, 20 Units at 09/24/20 1820    isosorbide mononitrate (IMDUR) 24 hr tablet 30 mg, 30 mg, Oral, Daily, Lidia Brumfield MD    lidocaine (LIDODERM) 5 % patch 2 patch, 2 patch, Topical, Daily, Jefe Cordero MD, 2 patch at 09/24/20 0382    LORazepam (ATIVAN) tablet 0 5 mg, 0 5 mg, Oral, HS, Jefe Cordero MD, 0 5 mg at 09/24/20 2234    LORazepam (ATIVAN) tablet 0 5 mg, 0 5 mg, Oral, Q8H PRN, Laura Herman DO, 0 5 mg at 09/25/20 0618    melatonin tablet 6 mg, 6 mg, Oral, HS, Jefe Cordero MD, 6 mg at 09/24/20 2234    methocarbamol (ROBAXIN) tablet 1,000 mg, 1,000 mg, Oral, Q8H [address], Laura Herman DO, 1,000 mg at 09/25/20 0618    metoprolol tartrate (LOPRESSOR) partial tablet 12 5 mg, 12 5 mg, Oral, Q12H [address], Josué Earl MD, 12 5 mg at 09/24/20 2233    oxyCODONE (ROXICODONE) IR tablet 2 5 mg, 2 5 mg, Oral, Q4H PRN **OR** oxyCODONE (ROXICODONE) IR tablet 5 mg, 5 mg, Oral, Q4H PRN, Shai Henderson MD, 5 mg at 09/25/20 0404    pantoprazole (PROTONIX) EC tablet 40 mg, 40 mg, Oral, Early Morning, Shai Henderson MD, 40 mg at 09/25/20 0618    polyethylene glycol (MIRALAX) packet 17 g, 17 g, Oral, Daily, Shai Henderson MD, 17 g at 09/24/20 1789    potassium chloride (K-DUR,KLOR-CON) CR tablet 40 mEq, 40 mEq, Oral, BID, Josué Earl MD    rivaroxaban (XARELTO) tablet 20 mg, 20 mg, Oral, Daily With Blaine Powers MD, 20 mg at 09/24/20 1814    senna (SENOKOT) tablet 8 6 mg, 1 tablet, Oral, HS PRN, Shai Henderson MD    torsemide (DEMADEX) tablet 60 mg, 60 mg, Oral, BID (diuretic), Josué Earl MD, 60 mg at 09/24/20 1813    Labs & Results:    Lab Results   Component Value Date    CKTOTAL 160 02/24/2014    CKTOTAL 130 02/24/2014    CKTOTAL 148 02/23/2014    TROPONINI 2 07 (H) 09/21/2020    TROPONINI 2 49 (H) 09/21/2020    TROPONINI 2 38 (H) 09/21/2020       Lab Results   Component Value Date    GLUCOSE 167 (H) 09/21/2020    CALCIUM 9 1 09/25/2020     04/26/2014    K 3 6 09/25/2020    CO2 28 09/25/2020     09/25/2020    BUN 28 (H) 09/25/2020    CREATININE 1 89 (H) 09/25/2020       Lab Results   Component Value Date    WBC 6 51 09/23/2020    HGB 12 7 09/23/2020    HCT 42 0 09/23/2020    MCV 84 09/23/2020     09/23/2020     Results from last 7 days   Lab Units 09/20/20  2357   INR  1 97*       No results found for: CHOL  No results found for: HDL  No results found for: LDLCALC  No results found for: TRIG    Lab Results   Component Value Date    ALT 26 09/21/2020    AST 30 09/21/2020       Telemetry:   Personally reviewed by Diego Morales MD:  Sinus rhythm with heart rate ranging high 50s to 70s with rare ventricular ectopy but no VT/VF episodes    Imaging:  No new cardiac images to be reviewed      VTE Prophylaxis:  Rivaroxaban      Assessment:  Principal Problem:    Ventricular tachycardia (HCC)  Active Problems:    Hypokalemia    CAD (coronary artery disease)    Stage 3 chronic kidney disease ([facility])    CHF (congestive heart failure) ([facility])    Elevated troponin    Obstructive sleep apnea    Cardiomyopathy ([facility] [address] )    [facility]    Type 2 diabetes mellitus, with long-term current use of insulin ([facility])      1  Ventricular fibrillation store s/p 55 ICD shocks  -multifactorial etiology:  metolazone, torsemide, cetuximab and to lesser extent insulin  -[employer] Visia SC-ICD initially implanted 04/2009 with generator upgrade to MRI conditional device 11/2017  -R on T phenomenon preceding each episode  -amiodarone drip; lidocaine discontinued  -K 1 8 -> 3 6  -no further recurrence on telemetry at [facility]  -battery life documented at 7 4 years in June, down to 11 months x3 interrogation in a row     2  Mixed ischemic/nonischemic cardiomyopathy  -known CAD, alcohol use, JONNA on CPAP  -4/2019 Lexiscan:  Mid inferior/inferolateral infarct, gated EF 34%, dilated and inferior akinesis  -09/2019 TTE:  LVEF 50%, LVIDd 6 4 cm, normal RV size/function, LA 4 7 cm; no significant valvulopathy  -CAD: Aspirin, atorvastatin, isosorbide mononitrate  -CHF: amiloride, torsemide, low-dose metoprolol tartrate  -presenting NT-proBNP 614, can be falsely low due to obesity     3  Paroxysmal atrial fibrillation  -currently in sinus mechanism  -RF: JONNA, previous alcohol use, obesity  -[id number] 4, HAS-BLED 2  -LA 4 7 cm  -rivaroxaban, amiodarone PO, low-dose metoprolol tartrate      Plan:  1  From electrophysiology standpoint, patient is stable  He had no recurrence of VF or VT episodes  His battery life has significantly decreased from 7 years to now 11 months  He requires close monitoring    A follow-up appointment was arranged with primary cardiologist   Carlos on 10/01 at [facility]  A note was also left for him regarding the cause of his admission and the urgency for EP evaluation in their system  2  Otherwise, patient is appropriate for discharge  Would increase potassium supplementation to 60 mEq b i d  and arranged for ambulatory BMP prior to outpatient Cardiology follow-up  Case discussed and reviewed with Dr Danisha Cruz who agrees with my assessment and plan  Thank you for involving us in the care of your patient  Epic/ Allscripts/Care Everywhere records reviewed:  Yes    ** Please Note: Fluency DirectDictation voice to text software may have been used in the creation of this document   **

## 2020-09-25 NOTE — ASSESSMENT & PLAN NOTE
Wt Readings from Last 3 Encounters:   09/25/20 131 kg (289 lb 3 9 oz)   05/02/14 (!) 153 kg (336 lb 15 9 oz)       Chronic combined CHF EF 40-45%  OP regimen: torsemide 60mg BID, metolazone 2 5mg MWF (with prn increase to 5mg when he though he was overloaded), K 20mEq BID; not on BB  Heart failure is following  Restarted on torsemide  Continue to monitor

## 2020-09-25 NOTE — PROGRESS NOTES
Progress Note - [place] 1960, 62 y o  male MRN: [id number]    [facility] Encounter: [id number]    Primary Care Provider: No primary care provider on file  Date and time admitted to hospital: 9/21/2020  4:00 AM        * Ventricular tachycardia [facility])  Assessment & Plan  V-tach stom and ICD shocks likely multifactorial in the setting of diuretics, hypokalemia and Ctuximab for squamous cell carcinoma of the tongue  EP is following with daily interrogation  No further episode on tele  On oral amiodarone and Lopressor  Potassium replacement  EP recommending outpatient cardiology follow-up on 10/01        Hypokalemia  Assessment & Plan  Resolved  Continue amiloride for potassium-sparing  Continue potassium supplement    Type 2 diabetes mellitus, with long-term current use of insulin [facility])  Assessment & Plan  Lab Results   Component Value Date    HGBA1C 10 6 (H) 09/10/2020       Recent Labs     09/24/20  1604 09/24/20  2122 09/25/20  0640 09/25/20  1125   POCGLU 110 192* 163* 144*       Blood Sugar Average: Last 72 hrs:  (P) 118 [id number]     Poorly-controlled diabetes  Continue current Lantus and Humalog t i d  With meals  Continue insulin sliding scale  Patient requesting regular diet    Paroxysmal A-fib ([address] )  Assessment & Plan  Continue Xarelto    Obstructive sleep apnea  Assessment & Plan  Continue BiPAP q h s      CHF (congestive heart failure) (HCC)  Assessment & Plan  Wt Readings from Last 3 Encounters:   09/25/20 131 kg (289 lb 3 9 oz)   05/02/14 (!) 153 kg (336 lb 15 9 oz)       Chronic combined CHF EF 40-45%  OP regimen: torsemide 60mg BID, metolazone 2 5mg MWF (with prn increase to 5mg when he though he was overloaded), K 20mEq BID; not on BB  Heart failure is following  Restarted on torsemide  Continue to monitor    Stage 3 chronic kidney disease [facility])  Assessment & Plan  Stable  Nephrology is following  Continue to monitor after restarting diuretics           VTE Pharmacologic Prophylaxis:   Pharmacologic: Rivaroxaban (Xarelto)  Mechanical VTE Prophylaxis in Place: Yes    Patient Centered Rounds: I have performed bedside rounds with nursing staff today  Discussions with Specialists or Other Care Team Provider:  Nephrology    Education and Discussions with Family / Patient:  Patient    Time Spent for Care: 30 minutes  More than 50% of total time spent on counseling and coordination of care as described above  Current Length of Stay: 4 day(s)    Current Patient Status: Inpatient   Certification Statement: The patient will continue to require additional inpatient hospital stay due to Above    Discharge Plan / Estimated Discharge Date:  Home tomorrow, does not have a right for today  Monitor kidney function    Code Status: Level 1 - Full Code      Subjective:   Patient seen and examined  Comfortable in bed  No event overnight  Requesting regular diet  Objective:     Vitals:   Temp (24hrs), Av 2 °F (36 8 °C), Min:98 °F (36 7 °C), Max:98 5 °F (36 9 °C)    Temp:  [98 °F (36 7 °C)-98 5 °F (36 9 °C)] 98 °F (36 7 °C)  HR:  [73-85] 81  Resp:  [18-20] 18  BP: ()/(66-73) 99/67  SpO2:  [92 %-100 %] 94 %  Body mass index is 41 5 kg/m²  Input and Output Summary (last 24 hours):        Intake/Output Summary (Last 24 hours) at 2020 1239  Last data filed at 2020 0800  Gross per 24 hour   Intake 600 ml   Output    Net 600 ml       Physical Exam:     Physical Exam  Patient is awake alert oriented no acute distress  Obese  Lung clear to auscultation bilateral  Heart positive S1-S2 no murmur  Abdomen soft obese nontender  Lower extremities no edema    Additional Data:     Labs:    Results from last 7 days   Lab Units 20  0559  20  0427   WBC Thousand/uL 6 51   < > 12 39*   HEMOGLOBIN g/dL 12 7   < > 11 8*   HEMATOCRIT % 42 0   < > 38 6   PLATELETS Thousands/uL 262   < > 259   NEUTROS PCT %  --   --  92*   LYMPHS PCT %  --   --  3*   MONOS PCT %  --   -- 5   EOS PCT %  --   --  0    < > = values in this interval not displayed  Results from last 7 days   Lab Units 09/25/20  0541  09/21/20  1402  09/21/20  0006   POTASSIUM mmol/L 3 6   < > 3 0*   < >  --    CHLORIDE mmol/L 101   < > 100   < >  --    CO2 mmol/L 28   < > 29   < >  --    CO2, I-STAT mmol/L  --   --   --   --  32   BUN mg/dL 28*   < > 20   < >  --    CREATININE mg/dL 1 89*   < > 1 54*   < >  --    CALCIUM mg/dL 9 1   < > 8 3   < >  --    ALK PHOS U/L  --   --  135*   < >  --    ALT U/L  --   --  26   < >  --    AST U/L  --   --  30   < >  --    GLUCOSE, ISTAT mg/dl  --   --   --   --  167*    < > = values in this interval not displayed  Results from last 7 days   Lab Units 09/20/20  2357   INR  1 97*       * I Have Reviewed All Lab Data Listed Above  * Additional Pertinent Lab Tests Reviewed: Mary 67 Admission Reviewed    Imaging:    Imaging Reports Reviewed Today Include:   Imaging Personally Reviewed by Myself Includes:     Recent Cultures (last 7 days):     Results from last 7 days   Lab Units 09/23/20  0559 09/23/20  0507   BLOOD CULTURE  No Growth at 48 hrs  No Growth at 48 hrs         Last 24 Hours Medication List:   Current Facility-Administered Medications   Medication Dose Route Frequency Provider Last Rate    acetaminophen  650 mg Oral Q4H PRN Teressa Quezada MD      al mag oxide-diphenhydramine-lidocaine viscous  10 mL Swish & Swallow Q4H PRN Teressa Quezada MD      AMILoride  5 mg Oral BID Teressa Quezada MD      amiodarone  200 mg Oral BID With Meals Teressa Quezada MD      aspirin  81 mg Oral Daily Teressa Quezada MD      atorvastatin  80 mg Oral Daily With Shaji Moreno MD      ferrous sulfate  325 mg Oral Every Other Day Teressa Quezada MD      fish oil  1,000 mg Oral Daily Teressa Quezada MD      fluticasone  2 spray Each Nare Daily Teressa Quezada MD      gabapentin  600 mg Oral TID Teressa Quezada MD      insulin glargine  30 Units Subcutaneous Q12H [facility] Hortencia Rodriguez MD      insulin lispro  2-12 Units Subcutaneous TID AC Hortencia Rodriguez MD      insulin lispro  20 Units Subcutaneous TID With Meals Hortencia Rodriguez MD      isosorbide mononitrate  30 mg Oral Daily Milton Khan MD      lidocaine  2 patch Topical Daily Hortencia Rodriguez MD      LORazepam  0 5 mg Oral HS Hortencia Rodriguez MD      LORazepam  0 5 mg Oral Q8H PRN Rubio Joseph DO      melatonin  6 mg Oral HS Hortencia Rodriguez MD      methocarbamol  1,000 mg Oral [facility] Rubio Joseph DO      metoprolol tartrate  12 5 mg Oral Q12H [facility] Milton Khan MD      oxyCODONE  2 5 mg Oral Q4H PRN Hortencia Rodriguez MD      Or   [place] oxyCODONE  5 mg Oral Q4H PRN Hortencia Rodriguez MD      pantoprazole  40 mg Oral Early Morning Hortencia Rodriguez MD      polyethylene glycol  17 g Oral Daily Hortencia Rodriguez MD      potassium chloride  40 mEq Oral BID Milton Khan MD      rivaroxaban  20 mg Oral Daily With Dinner Hortencia Rodriguez MD      senna  1 tablet Oral HS PRN Hortencia Rodriguez MD      torsemide  60 mg Oral BID (diuretic) Milton Khan MD          Today, Patient Was Seen By: Rubio Joseph DO    ** Please Note: This note has been constructed using a voice recognition system   **

## 2020-09-25 NOTE — ASSESSMENT & PLAN NOTE
V-tach stom and ICD shocks likely multifactorial in the setting of diuretics, hypokalemia and Ctuximab for squamous cell carcinoma of the tongue  EP is following with daily interrogation  No further episode on tele  On oral amiodarone and Lopressor  Potassium replacement  EP recommending outpatient cardiology follow-up on 10/01

## 2020-09-25 NOTE — PLAN OF CARE
Problem: Prexisting or High Potential for Compromised Skin Integrity  Goal: Skin integrity is maintained or improved  Description: INTERVENTIONS:  - Identify patients at risk for skin breakdown  - Assess and monitor skin integrity  - Assess and monitor nutrition and hydration status  - Monitor labs   - Assess for incontinence   - Turn and reposition patient  - Assist with mobility/ambulation  - Relieve pressure over bony prominences  - Avoid friction and shearing  - Provide appropriate hygiene as needed including keeping skin clean and dry  - Evaluate need for skin moisturizer/barrier cream  - Collaborate with interdisciplinary team   - Patient/family teaching  - Consider wound care consult   Outcome: Progressing     Problem: Potential for Falls  Goal: Patient will remain free of falls  Description: INTERVENTIONS:  - Assess patient frequently for physical needs  -  Identify cognitive and physical deficits and behaviors that affect risk of falls    -  [place] fall precautions as indicated by assessment   - Educate patient/family on patient safety including physical limitations  - Instruct patient to call for assistance with activity based on assessment  - Modify environment to reduce risk of injury  - Consider OT/PT consult to assist with strengthening/mobility  Outcome: Progressing     Problem: PAIN - ADULT  Goal: Verbalizes/displays adequate comfort level or baseline comfort level  Description: Interventions:  - Encourage patient to monitor pain and request assistance  - Assess pain using appropriate pain scale  - Administer analgesics based on type and severity of pain and evaluate response  - Implement non-pharmacological measures as appropriate and evaluate response  - Consider cultural and social influences on pain and pain management  - Notify physician/advanced practitioner if interventions unsuccessful or patient reports new pain  Outcome: Progressing     Problem: INFECTION - ADULT  Goal: Absence or prevention of progression during hospitalization  Description: INTERVENTIONS:  - Assess and monitor for signs and symptoms of infection  - Monitor lab/diagnostic results  - Monitor all insertion sites, i e  indwelling lines, tubes, and drains  - Administer medications as ordered  - Instruct and encourage patient and family to use good hand hygiene technique  - Identify and instruct in appropriate isolation precautions for identified infection/condition  Outcome: Progressing     Problem: SAFETY ADULT  Goal: Patient will remain free of falls  Description: INTERVENTIONS:  - Assess patient frequently for physical needs  -  Identify cognitive and physical deficits and behaviors that affect risk of falls  -  [place] fall precautions as indicated by assessment   - Educate patient/family on patient safety including physical limitations  - Instruct patient to call for assistance with activity based on assessment  - Modify environment to reduce risk of injury  - Consider OT/PT consult to assist with strengthening/mobility  Outcome: Progressing     Problem: Knowledge Deficit  Goal: Patient/family/caregiver demonstrates understanding of disease process, treatment plan, medications, and discharge instructions  Description: Complete learning assessment and assess knowledge base    Interventions:  - Provide teaching at level of understanding  - Provide teaching via preferred learning methods  Outcome: Progressing     Problem: CARDIOVASCULAR - ADULT  Goal: Maintains optimal cardiac output and hemodynamic stability  Description: INTERVENTIONS:  - Monitor I/O, vital signs and rhythm  - Monitor for S/S and trends of decreased cardiac output  - Administer and titrate ordered vasoactive medications to optimize hemodynamic stability  - Assess quality of pulses, skin color and temperature  - Assess for signs of decreased coronary artery perfusion  - Instruct patient to report change in severity of symptoms  Outcome: Progressing  Goal: Absence of cardiac dysrhythmias or at baseline rhythm  Description: INTERVENTIONS:  - Continuous cardiac monitoring, vital signs, obtain 12 lead EKG if ordered  - Administer antiarrhythmic and heart rate control medications as ordered  - Monitor electrolytes and administer replacement therapy as ordered  Outcome: Progressing     Problem: Nutrition/Hydration-ADULT  Goal: Nutrient/Hydration intake appropriate for improving, restoring or maintaining nutritional needs  Description: Monitor and assess patient's nutrition/hydration status for malnutrition  Collaborate with interdisciplinary team and initiate plan and interventions as ordered  Monitor patient's weight and dietary intake as ordered or per policy  Utilize nutrition screening tool and intervene as necessary  Determine patient's food preferences and provide high-protein, high-caloric foods as appropriate       INTERVENTIONS:  - Monitor oral intake, urinary output, labs, and treatment plans  - Assess nutrition and hydration status and recommend course of action  - Evaluate amount of meals eaten  - Assist patient with eating if necessary   - Allow adequate time for meals  - Recommend/ encourage appropriate diets, oral nutritional supplements, and vitamin/mineral supplements  - Order, calculate, and assess calorie counts as needed  - Recommend, monitor, and adjust tube feedings and TPN/PPN based on assessed needs  - Assess need for intravenous fluids  - Provide specific nutrition/hydration education as appropriate  - Include patient/family/caregiver in decisions related to nutrition  Outcome: Progressing

## 2020-09-25 NOTE — ASSESSMENT & PLAN NOTE
Lab Results   Component Value Date    HGBA1C 10 6 (H) 09/10/2020       Recent Labs     09/24/20  1604 09/24/20  2122 09/25/20  0640 09/25/20  1125   POCGLU 110 192* 163* 144*       Blood Sugar Average: Last 72 hrs:  (P) 678 [id number]     Poorly-controlled diabetes  Continue current Lantus and Humalog t i d   With meals  Continue insulin sliding scale  Patient requesting regular diet

## 2020-09-25 NOTE — PROGRESS NOTES
NEPHROLOGY PROGRESS NOTE   Rebecca Herzog 62 y o  male MRN: [id number]  Unit/Bed#: [facility] 727-01 Encounter: [id number]    ASSESSMENT & PLAN:  67-year-old male with history of CHF ejection fraction 40%, AFib on Xarelto sleep apnea, history of squamous cell carcinoma of the time on cetuximab/RT presented with complain of recurrent firing of ICD  He was found to be having VT episodes  Was given amiodarone bolus followed by amiodarone drip and Lidocaine infusion on admission in ICU  Nephrology consulted for hypokalemia  Follows outpatient with Dr Gabino Isaacs and was on amiloride 10 mg in the past but has been off it recently  Hypokalemia, likely from renal loss with the use of diuretics  -potassium level was 1 8 on 09/20  -potassium today 3 6  -hypokalemia resolved with replacement as well as with initiation of Amiloride 5 mg twice daily since 09/22  -continue amiloride 5 mg twice daily, potassium chloride 20 mEq twice daily  In the past has been intolerant of spironolactone as it caused hypotension  -was resumed on home dose torsemide 60 mg bid on 9/23 by cardiology  -continue to monitor potassium level  IF potassium drops, may increase kcl dose     Chronic kidney disease stage 3  -recent baseline creatinine around 1 4-2 3  -renal function has been fluctuating based on volume status   -chronic kidney disease likely due to cardiorenal syndrome, age-related nephron loss  -follow with Dr Gabino Isaacs  -creatinine trending up to 1 89 today  May have to accept slightly higher creatinine to maintain volume status    Fluid overload/cardiomyopathy with ejection fraction 40% to 45%  - weight trending down to 289 lbs   Continue to monitor with diuresis  -as outpatient has been on torsemide 60 mg twice daily along with metolazone 2 5 mg every 3rd day    -patient is being followed by heart failure team, now back on torsemide since 9/23  -continue current dose of torsemide     Ventricular tachycardia and ICD shocks:  Continue management per Cardiology  If P following with daily interrogation  Suspected to be secondary to hypokalemia on presentation    Diabetes mellitus type 2, last A1c 10 6, continue management per primary team     Obstructive sleep apnea on BiPAP night    Discussed with Dr Jeanna Diaz , stable for outpatient care from nephrology side          SUBJECTIVE:  No chest pain and no nausea or vomiting  No SOB     OBJECTIVE:  Current Weight: Weight - Scale: 131 kg (289 lb 3 9 oz)  Vitals:    09/25/20 0945   BP: 99/67   Pulse: 81   Resp:    Temp:    SpO2: 94%       Intake/Output Summary (Last 24 hours) at 9/25/2020 1333  Last data filed at 9/25/2020 0800  Gross per 24 hour   Intake 180 ml   Output    Net 180 ml       Physical Exam  General:  Ill looking, awake  Eyes: Conjunctivae pink,  Sclera anicteric  ENT: lips and mucous membranes moist  Neck: supple   Chest: Clear to Auscultation both lungs,  no crackles, ronchus or wheezing  CVS: S1 & S2 present, normal rate, regular rhythm, no murmur    Abdomen: soft, non-tender, non-distended, Bowel sounds normoactive  Extremities: no edema of  legs  Skin: no rash  Neuro: awake, alert, oriented x 3   Psych: Mood and affect appropriate       Medications:    Current Facility-Administered Medications:     acetaminophen (TYLENOL) tablet 650 mg, 650 mg, Oral, Q4H PRN, Shirley Ledezma MD, 650 mg at 09/25/20 0617    al mag oxide-diphenhydramine-lidocaine viscous (MAGIC MOUTHWASH) suspension 10 mL, 10 mL, Swish & Swallow, Q4H PRN, Shirley Ledezma MD, 10 mL at 09/25/20 1011    AMILoride tablet 5 mg, 5 mg, Oral, BID, Shirley Ledezma MD, 5 mg at 09/25/20 1013    amiodarone tablet 200 mg, 200 mg, Oral, BID With Meals, Shirley Ledezma MD, 200 mg at 09/25/20 0951    aspirin chewable tablet 81 mg, 81 mg, Oral, Daily, Shirley Ledezma MD, 81 mg at 09/25/20 0951    atorvastatin (LIPITOR) tablet 80 mg, 80 mg, Oral, Daily With Rachelle Bullard MD, 80 mg at 09/24/20 1815    ferrous sulfate tablet 325 mg, 325 mg, Oral, Every Other Day, Zohra Burks MD, 325 mg at 09/25/20 0950    fish oil capsule 1,000 mg, 1,000 mg, Oral, Daily, Zohra Burks MD, 1,000 mg at 09/25/20 0947    fluticasone (FLONASE) 50 mcg/act nasal spray 2 spray, 2 spray, Each Nare, Daily, Zohra Burks MD, 2 spray at 09/25/20 0959    gabapentin (NEURONTIN) capsule 600 mg, 600 mg, Oral, TID, Zohra Burks MD, 600 mg at 09/25/20 0953    insulin glargine (LANTUS) subcutaneous injection 30 Units 0 3 mL, 30 Units, Subcutaneous, Q12H [address], Zohra Burks MD, 30 Units at 09/25/20 0956    insulin lispro (HumaLOG) 100 units/mL subcutaneous injection 2-12 Units, 2-12 Units, Subcutaneous, TID AC, 2 Units at 09/25/20 1010 **AND** Fingerstick Glucose (POCT), , , TID AC, Zohra Burks MD    insulin lispro (HumaLOG) 100 units/mL subcutaneous injection 20 Units, 20 Units, Subcutaneous, TID With Meals, Zohra Burks MD, 20 Units at 09/25/20 1245    isosorbide mononitrate (IMDUR) 24 hr tablet 30 mg, 30 mg, Oral, Daily, Kartik Fuentes MD, 30 mg at 09/25/20 0952    lidocaine (LIDODERM) 5 % patch 2 patch, 2 patch, Topical, Daily, Zohra Burks MD, 2 patch at 09/24/20 6301    LORazepam (ATIVAN) tablet 0 5 mg, 0 5 mg, Oral, HS, Zohra Burks MD, 0 5 mg at 09/24/20 2234    LORazepam (ATIVAN) tablet 0 5 mg, 0 5 mg, Oral, Q8H PRN, Bran Pierre DO, 0 5 mg at 09/25/20 0618    melatonin tablet 6 mg, 6 mg, Oral, HS, Zohra Burks MD, 6 mg at 09/24/20 2234    methocarbamol (ROBAXIN) tablet 1,000 mg, 1,000 mg, Oral, Q8H [address], Bran Pierre DO, 1,000 mg at 09/25/20 0618    metoprolol tartrate (LOPRESSOR) partial tablet 12 5 mg, 12 5 mg, Oral, Q12H [address], Kartik Fuentes MD, 12 5 mg at 09/25/20 0942    oxyCODONE (ROXICODONE) IR tablet 2 5 mg, 2 5 mg, Oral, Q4H PRN **OR** oxyCODONE (ROXICODONE) IR tablet 5 mg, 5 mg, Oral, Q4H PRN, Zohra Burks MD, 5 mg at 09/25/20 0952    pantoprazole (PROTONIX) EC tablet 40 mg, 40 mg, Oral, Early Morning, Sonia Hemphill MD, 40 mg at 09/25/20 0618    polyethylene glycol (MIRALAX) packet 17 g, 17 g, Oral, Daily, Sonia Hemphill MD, 17 g at 09/25/20 0954    potassium chloride (K-DUR,KLOR-CON) CR tablet 40 mEq, 40 mEq, Oral, BID, Meaghan Molina MD, 40 mEq at 09/25/20 0951    rivaroxaban (XARELTO) tablet 20 mg, 20 mg, Oral, Daily With Lea Ortiz MD, 20 mg at 09/24/20 1814    senna (SENOKOT) tablet 8 6 mg, 1 tablet, Oral, HS PRN, Sonia Hemphill MD    torsemide [facility]) tablet 60 mg, 60 mg, Oral, BID (diuretic), Meaghan Molina MD, 60 mg at 09/25/20 0947    Invasive Devices:        Lab Results:   Results from last 7 days   Lab Units 09/25/20  0541 09/24/20  0506 09/23/20  2048 09/23/20  0559  09/22/20  0508  09/21/20  1402 09/21/20  0427 09/21/20  0006 09/20/20  2357   WBC Thousand/uL  --   --   --  6 51  --  9 04  --   --  12 39*  --  15 34*   HEMOGLOBIN g/dL  --   --   --  12 7  --  12 1  --   --  11 8*  --  12 8   I STAT HEMOGLOBIN g/dl  --   --   --   --   --   --   --   --   --  13 9  --    HEMATOCRIT %  --   --   --  42 0  --  38 8  --   --  38 6  --  40 9   HEMATOCRIT, ISTAT %  --   --   --   --   --   --   --   --   --  41  --    PLATELETS Thousands/uL  --   --   --  262  --  238  --   --  259  --  343   POTASSIUM mmol/L 3 6 3 8 3 8 4 3   < > 3 8   < > 3 0* 2 7*  --  1 8*   CHLORIDE mmol/L 101 103 106 110*   < > 108   < > 100 96*  --  94*   CO2 mmol/L 28 28 28 26   < > 31   < > 29 32  --  28   CO2, I-STAT mmol/L  --   --   --   --   --   --   --   --   --  32  --    BUN mg/dL 28* 23 20 17   < > 18   < > 20 23  --  28*   CREATININE mg/dL 1 89* 1 61* 1 55* 1 50*   < > 1 56*   < > 1 54* 1 75*  --  2 15*   CALCIUM mg/dL 9 1 8 8 8 9 8 8   < > 8 6   < > 8 3 8 2*  --  8 6   MAGNESIUM mg/dL 2 2 2 1 2 1  --   --   --   --   --  2 6  --  1 8   PHOSPHORUS mg/dL  --   --   --   --   --   --   --   --  3 4  --  1 7*   ALK PHOS U/L  --   --   --   --   --   --   --  135* 142*  --  148*   ALT U/L  --   --   --   --   --   --   --  26 29  --  28   AST U/L  --   --   --   --   --   --   --  30 28  --  23   GLUCOSE, ISTAT mg/dl  --   --   --   --   --   --   --   --   --  167*  --     < > = values in this interval not displayed  Previous work up:   CHEST      INDICATION:   vtach      COMPARISON:  4/26/2014     EXAM PERFORMED/VIEWS:  XR CHEST PORTABLE        FINDINGS:  Unipolar AICD is unchanged in position  External pacemaker pad is also identified      Cardiomegaly is noted      Mild pulmonary vascular congestion is new  There is hypoventilation also noted  No focal infiltrate is identified      Surgical clips are noted at the base of the neck  PICC catheter is noted with tip in SVC      Osseous structures appear within normal limits for patient age      IMPRESSION:     Cardiomegaly with mild pulmonary vascular congestion  Portions of the record may have been created with voice recognition software  Occasional wrong word or "sound a like" substitutions may have occurred due to the inherent limitations of voice recognition software  Read the chart carefully and recognize, using context, where substitutions have occurred  If you have any questions, please contact the dictating provider

## 2020-09-26 VITALS
SYSTOLIC BLOOD PRESSURE: 103 MMHG | DIASTOLIC BLOOD PRESSURE: 60 MMHG | HEIGHT: 70 IN | TEMPERATURE: 97.9 F | OXYGEN SATURATION: 95 % | WEIGHT: 290.79 LBS | RESPIRATION RATE: 16 BRPM | HEART RATE: 85 BPM | BODY MASS INDEX: 41.63 KG/M2

## 2020-09-26 LAB
ANION GAP SERPL CALCULATED.3IONS-SCNC: 4 MMOL/L (ref 4–13)
BUN SERPL-MCNC: 32 MG/DL (ref 5–25)
CALCIUM SERPL-MCNC: 8.9 MG/DL (ref 8.3–10.1)
CHLORIDE SERPL-SCNC: 102 MMOL/L (ref 100–108)
CO2 SERPL-SCNC: 31 MMOL/L (ref 21–32)
CREAT SERPL-MCNC: 2.06 MG/DL (ref 0.6–1.3)
GFR SERPL CREATININE-BSD FRML MDRD: 34 ML/MIN/1.73SQ M
GLUCOSE SERPL-MCNC: 144 MG/DL (ref 65–140)
GLUCOSE SERPL-MCNC: 162 MG/DL (ref 65–140)
GLUCOSE SERPL-MCNC: 206 MG/DL (ref 65–140)
POTASSIUM SERPL-SCNC: 4.1 MMOL/L (ref 3.5–5.3)
SODIUM SERPL-SCNC: 137 MMOL/L (ref 136–145)

## 2020-09-26 PROCEDURE — 99239 HOSP IP/OBS DSCHRG MGMT >30: CPT | Performed by: INTERNAL MEDICINE

## 2020-09-26 PROCEDURE — 94760 N-INVAS EAR/PLS OXIMETRY 1: CPT

## 2020-09-26 PROCEDURE — 82948 REAGENT STRIP/BLOOD GLUCOSE: CPT

## 2020-09-26 PROCEDURE — 99232 SBSQ HOSP IP/OBS MODERATE 35: CPT | Performed by: INTERNAL MEDICINE

## 2020-09-26 PROCEDURE — 94660 CPAP INITIATION&MGMT: CPT

## 2020-09-26 PROCEDURE — 80048 BASIC METABOLIC PNL TOTAL CA: CPT | Performed by: INTERNAL MEDICINE

## 2020-09-26 RX ORDER — FERROUS SULFATE 325(65) MG
325 TABLET ORAL EVERY OTHER DAY
Qty: 30 TABLET | Refills: 0 | Status: SHIPPED | OUTPATIENT
Start: 2020-09-27

## 2020-09-26 RX ORDER — GABAPENTIN 300 MG/1
600 CAPSULE ORAL 3 TIMES DAILY
Qty: 90 CAPSULE | Refills: 0 | Status: SHIPPED | OUTPATIENT
Start: 2020-09-26

## 2020-09-26 RX ORDER — ASPIRIN 81 MG/1
81 TABLET, CHEWABLE ORAL DAILY
Qty: 30 TABLET | Refills: 0 | Status: SHIPPED | OUTPATIENT
Start: 2020-09-26

## 2020-09-26 RX ORDER — CHLORAL HYDRATE 500 MG
1000 CAPSULE ORAL DAILY
Qty: 30 CAPSULE | Refills: 0 | Status: SHIPPED | OUTPATIENT
Start: 2020-09-26

## 2020-09-26 RX ORDER — TORSEMIDE 20 MG/1
60 TABLET ORAL 2 TIMES DAILY
Qty: 60 TABLET | Refills: 0 | Status: SHIPPED | OUTPATIENT
Start: 2020-09-26

## 2020-09-26 RX ORDER — FLUTICASONE PROPIONATE 50 MCG
2 SPRAY, SUSPENSION (ML) NASAL DAILY
Qty: 1 BOTTLE | Refills: 0 | Status: SHIPPED | OUTPATIENT
Start: 2020-09-26

## 2020-09-26 RX ORDER — ISOSORBIDE MONONITRATE 30 MG/1
30 TABLET, EXTENDED RELEASE ORAL DAILY
Qty: 30 TABLET | Refills: 0 | Status: SHIPPED | OUTPATIENT
Start: 2020-09-26

## 2020-09-26 RX ORDER — LIDOCAINE 50 MG/G
2 PATCH TOPICAL DAILY
Qty: 30 PATCH | Refills: 0 | Status: SHIPPED | OUTPATIENT
Start: 2020-09-26

## 2020-09-26 RX ORDER — INSULIN GLARGINE 100 [IU]/ML
30 INJECTION, SOLUTION SUBCUTANEOUS EVERY 12 HOURS SCHEDULED
Refills: 0
Start: 2020-09-26

## 2020-09-26 RX ORDER — POTASSIUM CHLORIDE 20 MEQ/1
40 TABLET, EXTENDED RELEASE ORAL 2 TIMES DAILY
Qty: 60 TABLET | Refills: 0 | Status: SHIPPED | OUTPATIENT
Start: 2020-09-26

## 2020-09-26 RX ORDER — ATORVASTATIN CALCIUM 80 MG/1
80 TABLET, FILM COATED ORAL
Qty: 30 TABLET | Refills: 0 | Status: SHIPPED | OUTPATIENT
Start: 2020-09-26

## 2020-09-26 RX ORDER — AMIODARONE HYDROCHLORIDE 200 MG/1
200 TABLET ORAL 2 TIMES DAILY WITH MEALS
Qty: 60 TABLET | Refills: 0 | Status: SHIPPED | OUTPATIENT
Start: 2020-09-26

## 2020-09-26 RX ORDER — LORAZEPAM 0.5 MG/1
0.5 TABLET ORAL
Qty: 10 TABLET | Refills: 0
Start: 2020-09-26 | End: 2020-10-06

## 2020-09-26 RX ORDER — PANTOPRAZOLE SODIUM 40 MG/1
40 TABLET, DELAYED RELEASE ORAL
Qty: 30 TABLET | Refills: 0 | Status: SHIPPED | OUTPATIENT
Start: 2020-09-27

## 2020-09-26 RX ORDER — AMILORIDE HYDROCHLORIDE 5 MG/1
5 TABLET ORAL 2 TIMES DAILY
Qty: 60 TABLET | Refills: 0 | Status: SHIPPED | OUTPATIENT
Start: 2020-09-26

## 2020-09-26 RX ORDER — ACETAMINOPHEN 325 MG/1
650 TABLET ORAL EVERY 4 HOURS PRN
Refills: 0
Start: 2020-09-26

## 2020-09-26 RX ADMIN — INSULIN GLARGINE 30 UNITS: 100 INJECTION, SOLUTION SUBCUTANEOUS at 09:58

## 2020-09-26 RX ADMIN — INSULIN LISPRO 20 UNITS: 100 INJECTION, SOLUTION INTRAVENOUS; SUBCUTANEOUS at 12:00

## 2020-09-26 RX ADMIN — Medication 12.5 MG: at 09:59

## 2020-09-26 RX ADMIN — INSULIN LISPRO 4 UNITS: 100 INJECTION, SOLUTION INTRAVENOUS; SUBCUTANEOUS at 12:00

## 2020-09-26 RX ADMIN — OXYCODONE HYDROCHLORIDE 5 MG: 5 TABLET ORAL at 03:12

## 2020-09-26 RX ADMIN — OXYCODONE HYDROCHLORIDE 5 MG: 5 TABLET ORAL at 10:49

## 2020-09-26 RX ADMIN — AMILORIDE HYDROCLORIDE 5 MG: 5 TABLET ORAL at 09:59

## 2020-09-26 RX ADMIN — ASPIRIN 81 MG CHEWABLE TABLET 81 MG: 81 TABLET CHEWABLE at 09:56

## 2020-09-26 RX ADMIN — ISOSORBIDE MONONITRATE 30 MG: 30 TABLET, EXTENDED RELEASE ORAL at 09:59

## 2020-09-26 RX ADMIN — GABAPENTIN 600 MG: 300 CAPSULE ORAL at 09:57

## 2020-09-26 RX ADMIN — POTASSIUM CHLORIDE 40 MEQ: 1500 TABLET, EXTENDED RELEASE ORAL at 09:57

## 2020-09-26 RX ADMIN — METHOCARBAMOL TABLETS 1000 MG: 500 TABLET, COATED ORAL at 13:02

## 2020-09-26 RX ADMIN — OMEGA-3 FATTY ACIDS CAP 1000 MG 1000 MG: 1000 CAP at 09:57

## 2020-09-26 RX ADMIN — OXYCODONE HYDROCHLORIDE 2.5 MG: 5 TABLET ORAL at 12:59

## 2020-09-26 RX ADMIN — AMIODARONE HYDROCHLORIDE 200 MG: 200 TABLET ORAL at 09:59

## 2020-09-26 RX ADMIN — PANTOPRAZOLE SODIUM 40 MG: 40 TABLET, DELAYED RELEASE ORAL at 05:51

## 2020-09-26 RX ADMIN — LORAZEPAM 0.5 MG: 0.5 TABLET ORAL at 12:59

## 2020-09-26 RX ADMIN — ACETAMINOPHEN 650 MG: 325 TABLET, FILM COATED ORAL at 02:42

## 2020-09-26 RX ADMIN — METHOCARBAMOL TABLETS 1000 MG: 500 TABLET, COATED ORAL at 05:51

## 2020-09-26 RX ADMIN — INSULIN LISPRO 20 UNITS: 100 INJECTION, SOLUTION INTRAVENOUS; SUBCUTANEOUS at 07:30

## 2020-09-26 RX ADMIN — TORSEMIDE 60 MG: 20 TABLET ORAL at 09:59

## 2020-09-26 RX ADMIN — POLYETHYLENE GLYCOL 3350 17 G: 17 POWDER, FOR SOLUTION ORAL at 10:00

## 2020-09-26 RX ADMIN — FLUTICASONE PROPIONATE 2 SPRAY: 50 SPRAY, METERED NASAL at 09:56

## 2020-09-26 RX ADMIN — LIDOCAINE HYDROCHLORIDE 10 ML: 20 SOLUTION ORAL; TOPICAL at 03:16

## 2020-09-26 NOTE — PROGRESS NOTES
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, baseline creatinine widely fluctuant given cardiomyopathy between 1 4-2 3  3  Cardiomyopathy, ejection fraction 40-45%  4  Recent ventricular tachycardia with multiple ICD shock secondary to severe hypokalemia, potassium overall has significantly improved  5  Diabetes poorly controlled recent hemoglobin A1c at 10 6  6  Morbid obesity with obstructive sleep apnea    PLAN:  · Overall potassium has normalized at 4 1  · Continue with the amiloride as well as potassium supplementation  · Stable renal function with a creatinine of 2 0  · Will need close follow-up as an outpatient, recommend close follow-up with Dr Dejan Crow with laboratory studies within 1 week of discharge  SUBJECTIVE:  Seen examined  Patient awake alert  Overall is feeling better  No active chest pain or shortness of breath  No abdominal pain  Appetite is stable  Review of Systems    OBJECTIVE:  Current Weight: Weight - Scale: 132 kg (290 lb 12 6 oz)  Vitals:    09/26/20 0355 09/26/20 0600 09/26/20 0758 09/26/20 0957   BP:   104/63 103/60   BP Location:       Pulse:   80 85   Resp:       Temp:   97 9 °F (36 6 °C)    TempSrc:       SpO2: 96%  94% 95%   Weight:  132 kg (290 lb 12 6 oz)     Height:           Intake/Output Summary (Last 24 hours) at 9/26/2020 1107  Last data filed at 9/25/2020 1757  Gross per 24 hour   Intake 400 ml   Output    Net 400 ml       Physical Exam  Constitutional:       Appearance: He is obese  He is not ill-appearing  HENT:      Head: Normocephalic and atraumatic  Eyes:      General: No scleral icterus    Cardiovascular:      Rate and Rhythm: Normal rate and regular rhythm  Pulmonary:      Effort: Pulmonary effort is normal       Breath sounds: Normal breath sounds  Abdominal:      General: There is no distension  Palpations: Abdomen is soft  Tenderness: There is no abdominal tenderness  Musculoskeletal:      Right lower leg: No edema  Left lower leg: No edema  Skin:     General: Skin is warm and dry  Findings: No rash  Neurological:      Mental Status: He is alert and oriented to person, place, and time           Medications:    Current Facility-Administered Medications:     acetaminophen (TYLENOL) tablet 650 mg, 650 mg, Oral, Q4H PRN, Capri Nino MD, 650 mg at 09/26/20 0242    al mag oxide-diphenhydramine-lidocaine viscous (MAGIC MOUTHWASH) suspension 10 mL, 10 mL, Swish & Swallow, Q4H PRN, Capri Nino MD, 10 mL at 09/26/20 0316    AMILoride tablet 5 mg, 5 mg, Oral, BID, Capri Nino MD, 5 mg at 09/26/20 6267    amiodarone tablet 200 mg, 200 mg, Oral, BID With Meals, Capri Nino MD, 200 mg at 09/26/20 7863    aspirin chewable tablet 81 mg, 81 mg, Oral, Daily, Capri Nino MD, 81 mg at 09/26/20 0956    atorvastatin (LIPITOR) tablet 80 mg, 80 mg, Oral, Daily With Tommie Alfonso MD, 80 mg at 09/25/20 1754    ferrous sulfate tablet 325 mg, 325 mg, Oral, Every Other Day, Capri Nino MD, 325 mg at 09/25/20 0950    fish oil capsule 1,000 mg, 1,000 mg, Oral, Daily, Capri Nino MD, 1,000 mg at 09/26/20 0957    fluticasone (FLONASE) 50 mcg/act nasal spray 2 spray, 2 spray, Each Nare, Daily, Capri Nino MD, 2 spray at 09/26/20 0956    gabapentin (NEURONTIN) capsule 600 mg, 600 mg, Oral, TID, Capri Nino MD, 600 mg at 09/26/20 0957    insulin glargine (LANTUS) subcutaneous injection 30 Units 0 3 mL, 30 Units, Subcutaneous, Q12H [address], Capri Nino MD, 30 Units at 09/26/20 0958    insulin lispro (HumaLOG) 100 units/mL subcutaneous injection 2-12 Units, 2-12 Units, Subcutaneous, TID AC, 2 Units at 09/25/20 1010 **AND** Fingerstick Glucose (POCT), , , TID AC, Claudia De MD    insulin lispro (HumaLOG) 100 units/mL subcutaneous injection 20 Units, 20 Units, Subcutaneous, TID With Meals, Claudia De MD, 20 Units at 09/26/20 0730    isosorbide mononitrate (IMDUR) 24 hr tablet 30 mg, 30 mg, Oral, Daily, Chris Mayo MD, 30 mg at 09/26/20 0959    lidocaine (LIDODERM) 5 % patch 2 patch, 2 patch, Topical, Daily, Claudia De MD, 2 patch at 09/24/20 5316    LORazepam (ATIVAN) tablet 0 5 mg, 0 5 mg, Oral, HS, Claudia De MD, 0 5 mg at 09/25/20 2142    LORazepam (ATIVAN) tablet 0 5 mg, 0 5 mg, Oral, Q8H PRN, Arelis Diop DO, 0 5 mg at 09/25/20 0618    melatonin tablet 6 mg, 6 mg, Oral, HS, Claudia De MD, 6 mg at 09/25/20 2142    methocarbamol (ROBAXIN) tablet 1,000 mg, 1,000 mg, Oral, Q8H [address], Arelis Diop DO, 1,000 mg at 09/26/20 0551    metoprolol tartrate (LOPRESSOR) partial tablet 12 5 mg, 12 5 mg, Oral, Q12H [address], Chris Mayo MD, 12 5 mg at 09/26/20 0959    oxyCODONE (ROXICODONE) IR tablet 2 5 mg, 2 5 mg, Oral, Q4H PRN **OR** oxyCODONE (ROXICODONE) IR tablet 5 mg, 5 mg, Oral, Q4H PRN, Claudia De MD, 5 mg at 09/26/20 1049    pantoprazole (PROTONIX) EC tablet 40 mg, 40 mg, Oral, Early Morning, Claudia De MD, 40 mg at 09/26/20 0551    polyethylene glycol (MIRALAX) packet 17 g, 17 g, Oral, Daily, Claudia De MD, 17 g at 09/26/20 1000    potassium chloride (K-DUR,KLOR-CON) CR tablet 40 mEq, 40 mEq, Oral, BID, Chris Mayo MD, 40 mEq at 09/26/20 0957    rivaroxaban (XARELTO) tablet 20 mg, 20 mg, Oral, Daily With Pearline Canavan, MD, 20 mg at 09/25/20 1754    senna (SENOKOT) tablet 8 6 mg, 1 tablet, Oral, HS PRN, Claudia De MD    torsemide (DEMADEX) tablet 60 mg, 60 mg, Oral, BID (diuretic), Chris Mayo MD, 60 mg at 09/26/20 4925    Laboratory Results:  Results from last 7 days   Lab Units 09/26/20  0507 09/25/20  0541 09/24/20  0506 09/23/20 2048 09/23/20  0559 09/22/20  1640 09/22/20  0508  09/21/20  0427 09/21/20  0006 09/20/20  2357   WBC Thousand/uL  --   --   --   --  6 51  --  9 04  --  12 39*  --  15 34*   HEMOGLOBIN g/dL  --   --   --   --  12 7  --  12 1  --  11 8*  --  12 8   I STAT HEMOGLOBIN g/dl  --   --   --   --   --   --   --   --   --  13 9  --    HEMATOCRIT %  --   --   --   --  42 0  --  38 8  --  38 6  --  40 9   HEMATOCRIT, ISTAT %  --   --   --   --   --   --   --   --   --  41  --    PLATELETS Thousands/uL  --   --   --   --  262  --  238  --  259  --  343   POTASSIUM mmol/L 4 1 3 6 3 8 3 8 4 3 4 4 3 8   < > 2 7*  --  1 8*   CHLORIDE mmol/L 102 101 103 106 110* 108 108   < > 96*  --  94*   CO2 mmol/L 31 28 28 28 26 29 31   < > 32  --  28   CO2, I-STAT mmol/L  --   --   --   --   --   --   --   --   --  32  --    BUN mg/dL 32* 28* 23 20 17 18 18   < > 23  --  28*   CREATININE mg/dL 2 06* 1 89* 1 61* 1 55* 1 50* 1 78* 1 56*   < > 1 75*  --  2 15*   CALCIUM mg/dL 8 9 9 1 8 8 8 9 8 8 8 7 8 6   < > 8 2*  --  8 6   MAGNESIUM mg/dL  --  2 2 2 1 2 1  --   --   --   --  2 6  --  1 8   PHOSPHORUS mg/dL  --   --   --   --   --   --   --   --  3 4  --  1 7*   GLUCOSE, ISTAT mg/dl  --   --   --   --   --   --   --   --   --  167*  --     < > = values in this interval not displayed

## 2020-09-26 NOTE — ASSESSMENT & PLAN NOTE
Wt Readings from Last 3 Encounters:   09/26/20 132 kg (290 lb 12 6 oz)   05/02/14 (!) 153 kg (336 lb 15 9 oz)       Chronic combined CHF EF 40-45%  OP regimen: torsemide 60mg BID, metolazone 2 5mg MWF (with prn increase to 5mg when he though he was overloaded), K 20mEq BID; not on BB  Heart failure is following  Restarted on torsemide  Continue to monitor

## 2020-09-26 NOTE — ASSESSMENT & PLAN NOTE
Underlying CAD with EF 40-45%  Per Cardiology, combination of mixed ischemic/nonischemic cardiomyopathy  Continue aspirin, statin, Imdur and Lopressor

## 2020-09-26 NOTE — PLAN OF CARE
Problem: Prexisting or High Potential for Compromised Skin Integrity  Goal: Skin integrity is maintained or improved  Description: INTERVENTIONS:  - Identify patients at risk for skin breakdown  - Assess and monitor skin integrity  - Assess and monitor nutrition and hydration status  - Monitor labs   - Assess for incontinence   - Turn and reposition patient  - Assist with mobility/ambulation  - Relieve pressure over bony prominences  - Avoid friction and shearing  - Provide appropriate hygiene as needed including keeping skin clean and dry  - Evaluate need for skin moisturizer/barrier cream  - Collaborate with interdisciplinary team   - Patient/family teaching  - Consider wound care consult   Outcome: Progressing     Problem: Potential for Falls  Goal: Patient will remain free of falls  Description: INTERVENTIONS:  - Assess patient frequently for physical needs  -  Identify cognitive and physical deficits and behaviors that affect risk of falls    -  [place] fall precautions as indicated by assessment   - Educate patient/family on patient safety including physical limitations  - Instruct patient to call for assistance with activity based on assessment  - Modify environment to reduce risk of injury  - Consider OT/PT consult to assist with strengthening/mobility  Outcome: Progressing     Problem: PAIN - ADULT  Goal: Verbalizes/displays adequate comfort level or baseline comfort level  Description: Interventions:  - Encourage patient to monitor pain and request assistance  - Assess pain using appropriate pain scale  - Administer analgesics based on type and severity of pain and evaluate response  - Implement non-pharmacological measures as appropriate and evaluate response  - Consider cultural and social influences on pain and pain management  - Notify physician/advanced practitioner if interventions unsuccessful or patient reports new pain  Outcome: Progressing     Problem: INFECTION - ADULT  Goal: Absence or prevention of progression during hospitalization  Description: INTERVENTIONS:  - Assess and monitor for signs and symptoms of infection  - Monitor lab/diagnostic results  - Monitor all insertion sites, i e  indwelling lines, tubes, and drains  - Administer medications as ordered  - Instruct and encourage patient and family to use good hand hygiene technique  - Identify and instruct in appropriate isolation precautions for identified infection/condition  Outcome: Progressing     Problem: SAFETY ADULT  Goal: Patient will remain free of falls  Description: INTERVENTIONS:  - Assess patient frequently for physical needs  -  Identify cognitive and physical deficits and behaviors that affect risk of falls  -  [place] fall precautions as indicated by assessment   - Educate patient/family on patient safety including physical limitations  - Instruct patient to call for assistance with activity based on assessment  - Modify environment to reduce risk of injury  - Consider OT/PT consult to assist with strengthening/mobility  Outcome: Progressing     Problem: Knowledge Deficit  Goal: Patient/family/caregiver demonstrates understanding of disease process, treatment plan, medications, and discharge instructions  Description: Complete learning assessment and assess knowledge base    Interventions:  - Provide teaching at level of understanding  - Provide teaching via preferred learning methods  Outcome: Progressing     Problem: CARDIOVASCULAR - ADULT  Goal: Maintains optimal cardiac output and hemodynamic stability  Description: INTERVENTIONS:  - Monitor I/O, vital signs and rhythm  - Monitor for S/S and trends of decreased cardiac output  - Administer and titrate ordered vasoactive medications to optimize hemodynamic stability  - Assess quality of pulses, skin color and temperature  - Assess for signs of decreased coronary artery perfusion  - Instruct patient to report change in severity of symptoms  Outcome: Progressing  Goal: Absence of cardiac dysrhythmias or at baseline rhythm  Description: INTERVENTIONS:  - Continuous cardiac monitoring, vital signs, obtain 12 lead EKG if ordered  - Administer antiarrhythmic and heart rate control medications as ordered  - Monitor electrolytes and administer replacement therapy as ordered  Outcome: Progressing     Problem: Nutrition/Hydration-ADULT  Goal: Nutrient/Hydration intake appropriate for improving, restoring or maintaining nutritional needs  Description: Monitor and assess patient's nutrition/hydration status for malnutrition  Collaborate with interdisciplinary team and initiate plan and interventions as ordered  Monitor patient's weight and dietary intake as ordered or per policy  Utilize nutrition screening tool and intervene as necessary  Determine patient's food preferences and provide high-protein, high-caloric foods as appropriate       INTERVENTIONS:  - Monitor oral intake, urinary output, labs, and treatment plans  - Assess nutrition and hydration status and recommend course of action  - Evaluate amount of meals eaten  - Assist patient with eating if necessary   - Allow adequate time for meals  - Recommend/ encourage appropriate diets, oral nutritional supplements, and vitamin/mineral supplements  - Order, calculate, and assess calorie counts as needed  - Recommend, monitor, and adjust tube feedings and TPN/PPN based on assessed needs  - Assess need for intravenous fluids  - Provide specific nutrition/hydration education as appropriate  - Include patient/family/caregiver in decisions related to nutrition  Outcome: Progressing

## 2020-09-26 NOTE — ASSESSMENT & PLAN NOTE
Lab Results   Component Value Date    HGBA1C 10 6 (H) 09/10/2020       Recent Labs     09/25/20  1635 09/25/20  2106 09/26/20  0635 09/26/20  1117   POCGLU 97 170* 144* 206*       Blood Sugar Average: Last 72 hrs:  (P) 460 [id number]     Poorly-controlled diabetes  Continue current Lantus and Humalog t i d   With meals  Continue insulin sliding scale  Patient requesting regular diet

## 2020-09-28 LAB
BACTERIA BLD CULT: NORMAL
BACTERIA BLD CULT: NORMAL

## 2020-09-28 NOTE — UTILIZATION REVIEW
Notification of Discharge  This is a Notification of Discharge from our facility [facility]  Please be advised that this patient has been discharge from our facility  Below you will find the admission and discharge date and time including the patients disposition  PRESENTATION DATE: 9/21/2020  4:00 AM  OBS ADMISSION DATE:   IP ADMISSION DATE: 9/21/20 0400   DISCHARGE DATE: 9/26/2020  3:58 PM  DISPOSITION: Home/Self Care Home/Self Care   Admission Orders listed below:  Admission Orders (From admission, onward)     Ordered        09/21/20 0404  Inpatient Admission  Once                   Please contact the UR Department if additional information is required to close this patient's authorization/case  [address] [place] Utilization Review Department  Main: [phone] x carefully listen to the prompts  All voicemails are confidential   [email or web address]  Send all requests for admission clinical reviews, approved or denied determinations and any other requests to dedicated fax number below belonging to the campus where the patient is receiving treatment   List of dedicated fax numbers:  [address] DENIALS (Administrative/Medical Necessity) [phone]   [address] (Maternity/NICU/Pediatrics) [phone]   Jhon Luis [phone]   [place] Delay [phone]   [facility] [phone]   [facility] [place] [facility] [address] [facility] [phone]   [facility]  [phone]   [address] [facility]  [address] [facility] [address] [facility] [phone]

## 2025-02-14 NOTE — DISCHARGE SUMMARY
Discharge Summary - Roxanne Garcia 62 y o  male MRN: [id number]    Unit/Bed#: [facility] 727-01 Encounter: [id number]    Admission Date:   Admission Orders (From admission, onward)     Ordered        09/21/20 0404  Inpatient Admission  Once                     Admitting Diagnosis: V-tach [facility]) [I47 2]    HPI: Odilia Vogel is a 63-year-old male with a history of CHF, coronary artery disease, AFib on Xarelto, CKD, hypertension, hyperlipidemia, type 2 diabetes, JONNA, and squamous cell carcinoma of the tongue s/p partial resection who presented as a transfer from [facility] after patient had innumerable episodes of V-tach at home and in the emergency department   While at [facility], patient was found to be severely hypokalemic  Patient was transferred to [facility] for further management and EP evaluation  Summary of Hospital Course: Patient was initially managed at [facility] in the ICU  When patient arrived, he was on an amiodarone and lidocaine drip  He was still persistently hypokalemic which was repleted appropriately  There were no further episodes of V-tach or VFib  Patient was transitioned to oral amiodarone and lopressor on the general medical floor  Patient remained stable throughout admission with no events on telemetry  Nephrology and heart failure were consulted and they offered recommendations on changing the patient's outpatient diuretic regimen  Patient was started on Amiloride 5 mg twice daily and potassium 20 mEq twice daily  Patient was resumed on home Torsemide 60 mg twice daily on 09/23 by cardiology with close monitoring of his potassium  His potassium remained stable throughout admission  EP performed daily interrogations of the ICD  His battery life decreased from 7 years to 11 months  It was determined that patient will require outpatient follow-up with cardiologist Dr Yuli Dean on 10/01 at [facility] for battery change   Patient has reported residual chest soreness from the ICD shocks that has improved throughout his stay  Patient denies any other complaints and is ready for discharge  Complications: None     Discharge Diagnoses: * Ventricular tachycardia (HCC)  Assessment & Plan  V-tach stom and ICD shocks likely multifactorial in the setting of diuretics, hypokalemia and Ctuximab for squamous cell carcinoma of the tongue  No further events on telemetry   EP evaluated- Battery life discreased from 7 years to 11 months  Patient has follow-up appointment with primary cardiologist Dr Yessica Coyle on 10/01 at [facility]  Patient stable for discharge from EP standpoint with increase in potassium to 60 mEq BID and outpatient BMP monitoring     Cardiology evaluated- resumed Torsemide at 60 mg BID     Hypokalemia  Assessment & Plan  Resolved  K+ 3 6 today   Nephrology evaluated- continue amiloride 5 mg BID twice daily for potassium sparing     Type 2 diabetes mellitus, with long-term current use of insulin [facility])  Assessment & Plan        Lab Results   Component Value Date     HGBA1C 10 6 (H) 09/10/2020                Recent Labs     09/23/20  1626 09/23/20  2122 09/24/20  0644 09/24/20  1044   POCGLU 160* 133 126 134      Glucose remained stable throughout admission   Poorly-controlled diabetes  Resume home diabetes medications      Paroxysmal A-fib (HCC)  Assessment & Plan  Continue Xarelto     Cardiomyopathy ([facility] [address] )  Assessment & Plan  Underlying CAD with EF 40-45%  Per Cardiology, combination of mixed ischemic/nonischemic cardiomyopathy  Continue aspirin, statin, Imdur and Lopressor     Obstructive sleep apnea  Assessment & Plan  Continue BiPAP q h s      CHF (congestive heart failure) (HCC)  Assessment & Plan      Wt Readings from Last 3 Encounters:   09/24/20 133 kg (294 lb 1 5 oz)   05/02/14 (!) 153 kg (336 lb 15 9 oz)         Chronic combined CHF EF 40-45%  OP regimen: torsemide 60mg BID, metolazone 2 5mg MWF (with prn increase to 5mg when he though he was overloaded), K 20mEq BID; not on BB  Heart failure is following  Restarted on torsemide     Stage 3 chronic kidney disease [facility])  Assessment & Plan  Stable  Nephrology followed throughout admission         Resolved Problems  Date Reviewed: 9/24/2020    None          Condition at Discharge: good         Discharge instructions/Information to patient and family:   See after visit summary for information provided to patient and family  Provisions for Follow-Up Care:  See after visit summary for information related to follow-up care and any pertinent home health orders  PCP: No primary care provider on file  Disposition: Home    Planned Readmission: No      Discharge Statement   I spent 45 minutes discharging the patient  This time was spent on the day of discharge  I had direct contact with the patient on the day of discharge  Additional documentation is required if more than 30 minutes were spent on discharge  Discharge Medications:  See after visit summary for reconciled discharge medications provided to patient and family  Stable.